# Patient Record
Sex: MALE | Race: WHITE | NOT HISPANIC OR LATINO | Employment: FULL TIME | URBAN - NONMETROPOLITAN AREA
[De-identification: names, ages, dates, MRNs, and addresses within clinical notes are randomized per-mention and may not be internally consistent; named-entity substitution may affect disease eponyms.]

---

## 2022-10-19 ENCOUNTER — TRANSCRIBE ORDERS (OUTPATIENT)
Dept: ADMINISTRATIVE | Facility: HOSPITAL | Age: 52
End: 2022-10-19

## 2022-10-19 DIAGNOSIS — R55 SYNCOPE AND COLLAPSE: Primary | ICD-10-CM

## 2022-10-20 ENCOUNTER — OFFICE VISIT (OUTPATIENT)
Dept: CARDIOLOGY | Facility: CLINIC | Age: 52
End: 2022-10-20

## 2022-10-20 DIAGNOSIS — E78.5 HYPERLIPIDEMIA, UNSPECIFIED HYPERLIPIDEMIA TYPE: ICD-10-CM

## 2022-10-20 DIAGNOSIS — Z72.0 TOBACCO USE: ICD-10-CM

## 2022-10-20 DIAGNOSIS — R55 SYNCOPE AND COLLAPSE: Primary | ICD-10-CM

## 2022-10-20 DIAGNOSIS — I10 ESSENTIAL HYPERTENSION: ICD-10-CM

## 2022-10-20 DIAGNOSIS — E11.9 TYPE 2 DIABETES MELLITUS WITHOUT COMPLICATION, WITHOUT LONG-TERM CURRENT USE OF INSULIN: ICD-10-CM

## 2022-10-20 PROCEDURE — 93000 ELECTROCARDIOGRAM COMPLETE: CPT | Performed by: NURSE PRACTITIONER

## 2022-10-20 PROCEDURE — 99204 OFFICE O/P NEW MOD 45 MIN: CPT | Performed by: NURSE PRACTITIONER

## 2022-10-20 RX ORDER — AMLODIPINE BESYLATE 2.5 MG/1
1 TABLET ORAL DAILY
COMMUNITY
Start: 2022-10-18

## 2022-10-20 RX ORDER — PANTOPRAZOLE SODIUM 40 MG/1
1 TABLET, DELAYED RELEASE ORAL DAILY
COMMUNITY
Start: 2022-09-16

## 2022-10-20 RX ORDER — ATORVASTATIN CALCIUM 20 MG/1
20 TABLET, FILM COATED ORAL DAILY
Qty: 30 TABLET | Refills: 1 | Status: SHIPPED | OUTPATIENT
Start: 2022-10-20

## 2022-10-20 RX ORDER — GABAPENTIN 300 MG/1
1 CAPSULE ORAL 2 TIMES DAILY
COMMUNITY
Start: 2022-10-19

## 2022-10-20 RX ORDER — GLYBURIDE 5 MG/1
2 TABLET ORAL 2 TIMES DAILY
COMMUNITY
Start: 2022-08-10

## 2022-10-20 RX ORDER — OFLOXACIN 3 MG/ML
SOLUTION AURICULAR (OTIC) AS NEEDED
COMMUNITY
Start: 2022-10-18

## 2022-10-20 NOTE — PROGRESS NOTES
Subjective   Serge Chandra is a 52 y.o. male.   Chief Complaint   Patient presents with   • Establish Care   • Loss of Consciousness      History of Present Illness   Serge Chandra is a 52-year-old male who presents to clinic today as a new patient for cardiology evaluation.    He was referred today for further evaluation of intermittent syncopal episodes that he has had over the last 6 months.  He reports his initial episodes were in July after he started lisinopril for hypertension.  He states a few days after taking his first dose of lisinopril he was standing in the hallway things went black and he fell down he did not have any other symptoms at that time denies loss of bowel or bladder function denies dyspnea prior to the episode denies palpitations prior to the episode he did have a prodrome of feeling dizzy and lightheaded before passing out.  He states after this initial episode about a week later he was sitting at the table when he had another similar episode.  His PCP felt it was secondary to the lisinopril and discontinued medication.  He did not monitor blood pressure at that time and was unsure if he experienced hypotension.  With his second episodes he denies any specific complaints.  He is a diabetic on metformin but denies any hypoglycemic episodes that he is aware of.  Once changing his lisinopril to Norvasc he had done well until a week ago when he had another episode at work.  He reports he was sitting on the ledge, taking a break and was feeling fine.  Denies any prodrome of dizziness or lightheadedness with this episode like he had experienced back in July.  He states he woke up on the ground.  He denies any head trauma abrasions, neurological symptoms severe headaches.  He remains on Norvasc 2.5 mg daily which was recently started.  He intermittently checks blood pressure at home which varies, no log available for review today. He had not experienced the same dizziness, lightheadedness he had  with the episodes in July when he had a sudden loss of consciousness.  Family history significant for father with MI.  He denies any history of hyperlipidemia.  He is a tobacco user.    The following portions of the patient's history were reviewed and updated as appropriate: allergies, current medications, past family history, past medical history, past social history, past surgical history and problem list.    Current Outpatient Medications:   •  amLODIPine (NORVASC) 2.5 MG tablet, Take 1 tablet by mouth Daily., Disp: , Rfl:   •  gabapentin (NEURONTIN) 300 MG capsule, Take 1 capsule by mouth 2 (Two) Times a Day., Disp: , Rfl:   •  glyburide (DIAbeta) 5 MG tablet, Take 2 tablets by mouth 2 (Two) Times a Day., Disp: , Rfl:   •  metFORMIN (GLUCOPHAGE) 1000 MG tablet, Take 1 tablet by mouth 2 (Two) Times a Day., Disp: , Rfl:   •  ofloxacin (FLOXIN) 0.3 % otic solution, As Needed., Disp: , Rfl:   •  pantoprazole (PROTONIX) 40 MG EC tablet, Take 1 tablet by mouth Daily., Disp: , Rfl:   •  atorvastatin (LIPITOR) 20 MG tablet, Take 1 tablet by mouth Daily., Disp: 30 tablet, Rfl: 1    Review of Systems   Constitutional: Negative for activity change, appetite change, chills, diaphoresis, fatigue and fever.   HENT: Negative for congestion, drooling, ear discharge, ear pain, mouth sores, nosebleeds, postnasal drip, rhinorrhea, sinus pressure, sneezing and sore throat.    Eyes: Negative for pain, discharge and visual disturbance.   Respiratory: Negative for cough, chest tightness, shortness of breath and wheezing.    Cardiovascular: Negative for chest pain, palpitations and leg swelling.   Gastrointestinal: Negative for abdominal pain, constipation, diarrhea, nausea and vomiting.   Endocrine: Negative for cold intolerance, heat intolerance, polydipsia, polyphagia and polyuria.   Musculoskeletal: Negative for arthralgias, myalgias and neck pain.   Skin: Negative for rash and wound.   Neurological: Positive for dizziness and  "syncope. Negative for speech difficulty, weakness, light-headedness and headaches.   Hematological: Negative for adenopathy. Does not bruise/bleed easily.   Psychiatric/Behavioral: Negative for confusion, dysphoric mood and sleep disturbance. The patient is not nervous/anxious.    All other systems reviewed and are negative.    Patient Active Problem List   Diagnosis   • Essential hypertension   • Hyperlipidemia   • Type 2 diabetes mellitus without complication, without long-term current use of insulin (HCC)   • Tobacco use     Past Medical History:   Diagnosis Date   • Diabetes mellitus (HCC) 07/2010   • Hyperlipidemia 10/27/2022   • Hypertension 09/2021     Past Surgical History:   Procedure Laterality Date   • KIDNEY SURGERY       /60 (BP Location: Left arm, Patient Position: Sitting, Cuff Size: Adult)   Ht 182.9 cm (72\")   Wt 74.8 kg (165 lb)   SpO2 99%   BMI 22.38 kg/m²   Vitals:    10/20/22 1315 10/20/22 1320 10/20/22 1321 10/20/22 1322   Orthostatic BP:  132/84 132/86 120/82   Orthostatic Pulse:  85 93 102   Patient Position: Sitting Sitting Sitting Sitting       Objective   No Known Allergies      Physical Exam  Vitals reviewed.   Constitutional:       Appearance: Normal appearance. He is well-developed.   HENT:      Head: Normocephalic.   Eyes:      Conjunctiva/sclera: Conjunctivae normal.   Neck:      Vascular: No JVD.   Cardiovascular:      Rate and Rhythm: Normal rate and regular rhythm.   Pulmonary:      Effort: Pulmonary effort is normal.      Breath sounds: Normal breath sounds.   Musculoskeletal:      Cervical back: Neck supple.      Right lower leg: No edema.      Left lower leg: No edema.   Skin:     General: Skin is warm and dry.   Neurological:      Mental Status: He is alert and oriented to person, place, and time.   Psychiatric:         Attention and Perception: Attention normal.         Mood and Affect: Mood normal.         Speech: Speech normal.         Behavior: Behavior normal. " Behavior is cooperative.         Cognition and Memory: Cognition normal.           ECG 12 Lead    Date/Time: 10/27/2022 9:10 AM  Performed by: Nayana Franklin APRN  Authorized by: Nayana Franklin APRN   Comparison: compared with previous ECG   Similar to previous ECG  Comparison to previous ECG: Compared to external EKG form PCP   Rhythm: sinus rhythm  Rate: normal  BPM: 85    Clinical impression: normal ECG  Comments: QT/QTc - 338/380                                Assessment & Plan   Diagnoses and all orders for this visit:    1. Syncope and collapse (Primary)  -     Stress Test With Myocardial Perfusion (1 Day); Future  -     Holter Monitor - 72 Hour Up To 15 Days; Future  -     Tilt Table; Future  -     US Carotid Bilateral; Future  -     ECG 12 Lead  EKG, reviewed and discussed  Further testing will be arranged  Advised if new or worsening symptoms while waiting work-up, go to the ER.  He expresses understanding    2. Essential hypertension  Slightly elevated today, patient is asked to closely monitor and call the clinic if BPs are persistently greater than 140/90 for medication titrations.  Counseling in low-sodium dietary intake.  Continue on Norvasc 2.5 mg daily    3. Hyperlipidemia, unspecified hyperlipidemia type  Discussed and reviewed most recent lab work.  His 10-year ASCVD risk is 21.3% and lifetime risk is 69%, given underlying history statin therapy is recommended.  We discussed risk and benefits of medications and he is willing to proceed.  Rx for Lipitor 20 mg daily he will need recheck CMP, CK and FLP in 2 to 3 months.    4. Type 2 diabetes mellitus without complication, without long-term current use of insulin (HCC)  Continue on current medications as recommended by PCP.  Discussed importance of tight glycemic control for overall health benefit    5. Tobacco use  Strongly encouraged in smoking cessation    Other orders  -     atorvastatin (LIPITOR) 20 MG tablet; Take 1 tablet by mouth  Daily.  Dispense: 30 tablet; Refill: 1      Review of medical record  Follow-up in 4 weeks to discuss and review testing, sooner if needed.

## 2022-10-27 ENCOUNTER — PATIENT ROUNDING (BHMG ONLY) (OUTPATIENT)
Dept: CARDIOLOGY | Facility: CLINIC | Age: 52
End: 2022-10-27

## 2022-10-27 VITALS
BODY MASS INDEX: 22.35 KG/M2 | OXYGEN SATURATION: 99 % | SYSTOLIC BLOOD PRESSURE: 148 MMHG | WEIGHT: 165 LBS | DIASTOLIC BLOOD PRESSURE: 60 MMHG | HEIGHT: 72 IN

## 2022-10-27 PROBLEM — E78.5 HYPERLIPIDEMIA: Status: ACTIVE | Noted: 2022-10-27

## 2022-10-27 PROBLEM — Z72.0 TOBACCO USE: Status: ACTIVE | Noted: 2022-10-27

## 2022-10-27 PROBLEM — E11.9 TYPE 2 DIABETES MELLITUS WITHOUT COMPLICATION, WITHOUT LONG-TERM CURRENT USE OF INSULIN: Status: ACTIVE | Noted: 2022-10-27

## 2022-10-27 PROBLEM — I10 ESSENTIAL HYPERTENSION: Status: ACTIVE | Noted: 2022-10-27

## 2022-10-27 NOTE — PROGRESS NOTES
October 27, 2022    Hello, may I speak with Serge Chandra?    My name is John    I am  with [facility]  [facility]  [address] [place] [address] [phone].    Before we get started may I verify your date of birth? 1970    I am calling to officially welcome you to our practice and ask about your recent visit. Is this a good time to talk? YES     Tell me about your visit with us. What things went well?  I had a good visit. I was happy with how things went. I have no complaints or concerns       We're always looking for ways to make our patients' experiences even better. Do you have recommendations on ways we may improve?   NO    Overall were you satisfied with your first visit to our practice? YES       I appreciate you taking the time to speak with me today. Is there anything else I can do for you? YES      Thank you, and have a great day.

## 2022-10-31 ENCOUNTER — APPOINTMENT (OUTPATIENT)
Dept: CARDIOLOGY | Facility: HOSPITAL | Age: 52
End: 2022-10-31

## 2022-10-31 ENCOUNTER — APPOINTMENT (OUTPATIENT)
Dept: NUCLEAR MEDICINE | Facility: HOSPITAL | Age: 52
End: 2022-10-31

## 2023-02-06 ENCOUNTER — HOSPITAL ENCOUNTER (EMERGENCY)
Facility: HOSPITAL | Age: 53
Discharge: HOME | End: 2023-02-06
Attending: EMERGENCY MEDICINE | Admitting: EMERGENCY MEDICINE
Payer: COMMERCIAL

## 2023-02-06 ENCOUNTER — APPOINTMENT (OUTPATIENT)
Dept: CT IMAGING | Facility: HOSPITAL | Age: 53
End: 2023-02-06
Payer: COMMERCIAL

## 2023-02-06 ENCOUNTER — APPOINTMENT (OUTPATIENT)
Dept: GENERAL RADIOLOGY | Facility: HOSPITAL | Age: 53
End: 2023-02-06
Payer: COMMERCIAL

## 2023-02-06 VITALS
HEART RATE: 89 BPM | DIASTOLIC BLOOD PRESSURE: 73 MMHG | SYSTOLIC BLOOD PRESSURE: 126 MMHG | HEIGHT: 71 IN | BODY MASS INDEX: 23.8 KG/M2 | RESPIRATION RATE: 18 BRPM | TEMPERATURE: 96.9 F | OXYGEN SATURATION: 97 % | WEIGHT: 170 LBS

## 2023-02-06 DIAGNOSIS — S20.211A CONTUSION OF RIB ON RIGHT SIDE, INITIAL ENCOUNTER: ICD-10-CM

## 2023-02-06 DIAGNOSIS — S09.90XA INJURY OF HEAD, INITIAL ENCOUNTER: ICD-10-CM

## 2023-02-06 DIAGNOSIS — S02.2XXA CLOSED FRACTURE OF NASAL BONE, INITIAL ENCOUNTER: Primary | ICD-10-CM

## 2023-02-06 LAB
ALBUMIN SERPL-MCNC: 4.2 G/DL (ref 3.5–5.2)
ALBUMIN/GLOB SERPL: 1.5 G/DL
ALP SERPL-CCNC: 79 U/L (ref 39–117)
ALT SERPL W P-5'-P-CCNC: 27 U/L (ref 1–41)
AMPHET+METHAMPHET UR QL: NEGATIVE
AMPHETAMINES UR QL: NEGATIVE
ANION GAP SERPL CALCULATED.3IONS-SCNC: 11.5 MMOL/L (ref 5–15)
AST SERPL-CCNC: 35 U/L (ref 1–40)
BARBITURATES UR QL SCN: NEGATIVE
BASOPHILS # BLD AUTO: 0.08 10*3/MM3 (ref 0–0.2)
BASOPHILS NFR BLD AUTO: 0.4 % (ref 0–1.5)
BENZODIAZ UR QL SCN: NEGATIVE
BILIRUB SERPL-MCNC: 0.3 MG/DL (ref 0–1.2)
BILIRUB UR QL STRIP: NEGATIVE
BUN SERPL-MCNC: 18 MG/DL (ref 6–20)
BUN/CREAT SERPL: 21.7 (ref 7–25)
BUPRENORPHINE SERPL-MCNC: NEGATIVE NG/ML
CALCIUM SPEC-SCNC: 8.9 MG/DL (ref 8.6–10.5)
CANNABINOIDS SERPL QL: POSITIVE
CHLORIDE SERPL-SCNC: 104 MMOL/L (ref 98–107)
CLARITY UR: CLEAR
CO2 SERPL-SCNC: 19.5 MMOL/L (ref 22–29)
COCAINE UR QL: NEGATIVE
COLOR UR: ABNORMAL
CREAT SERPL-MCNC: 0.83 MG/DL (ref 0.76–1.27)
CRP SERPL-MCNC: <0.3 MG/DL (ref 0–0.5)
DEPRECATED RDW RBC AUTO: 43.7 FL (ref 37–54)
EGFRCR SERPLBLD CKD-EPI 2021: 104.7 ML/MIN/1.73
EOSINOPHIL # BLD AUTO: 0.02 10*3/MM3 (ref 0–0.4)
EOSINOPHIL NFR BLD AUTO: 0.1 % (ref 0.3–6.2)
ERYTHROCYTE [DISTWIDTH] IN BLOOD BY AUTOMATED COUNT: 13 % (ref 12.3–15.4)
ETHANOL BLD-MCNC: <10 MG/DL (ref 0–10)
ETHANOL UR QL: <0.01 %
FLUAV RNA RESP QL NAA+PROBE: NOT DETECTED
FLUBV RNA ISLT QL NAA+PROBE: NOT DETECTED
GLOBULIN UR ELPH-MCNC: 2.8 GM/DL
GLUCOSE BLDC GLUCOMTR-MCNC: 200 MG/DL (ref 70–130)
GLUCOSE BLDC GLUCOMTR-MCNC: 321 MG/DL (ref 70–130)
GLUCOSE SERPL-MCNC: 280 MG/DL (ref 65–99)
GLUCOSE UR STRIP-MCNC: ABNORMAL MG/DL
HCT VFR BLD AUTO: 43.2 % (ref 37.5–51)
HGB BLD-MCNC: 14.4 G/DL (ref 13–17.7)
HGB UR QL STRIP.AUTO: NEGATIVE
HOLD SPECIMEN: NORMAL
HOLD SPECIMEN: NORMAL
IMM GRANULOCYTES # BLD AUTO: 0.15 10*3/MM3 (ref 0–0.05)
IMM GRANULOCYTES NFR BLD AUTO: 0.7 % (ref 0–0.5)
KETONES UR QL STRIP: ABNORMAL
LEUKOCYTE ESTERASE UR QL STRIP.AUTO: NEGATIVE
LYMPHOCYTES # BLD AUTO: 0.92 10*3/MM3 (ref 0.7–3.1)
LYMPHOCYTES NFR BLD AUTO: 4.5 % (ref 19.6–45.3)
MCH RBC QN AUTO: 30.8 PG (ref 26.6–33)
MCHC RBC AUTO-ENTMCNC: 33.3 G/DL (ref 31.5–35.7)
MCV RBC AUTO: 92.3 FL (ref 79–97)
METHADONE UR QL SCN: NEGATIVE
MONOCYTES # BLD AUTO: 1.1 10*3/MM3 (ref 0.1–0.9)
MONOCYTES NFR BLD AUTO: 5.3 % (ref 5–12)
NEUTROPHILS NFR BLD AUTO: 18.34 10*3/MM3 (ref 1.7–7)
NEUTROPHILS NFR BLD AUTO: 89 % (ref 42.7–76)
NITRITE UR QL STRIP: NEGATIVE
NRBC BLD AUTO-RTO: 0 /100 WBC (ref 0–0.2)
OPIATES UR QL: NEGATIVE
OXYCODONE UR QL SCN: NEGATIVE
PCP UR QL SCN: NEGATIVE
PH UR STRIP.AUTO: 5.5 [PH] (ref 5–8)
PLATELET # BLD AUTO: 240 10*3/MM3 (ref 140–450)
PMV BLD AUTO: 10.8 FL (ref 6–12)
POTASSIUM SERPL-SCNC: 5.1 MMOL/L (ref 3.5–5.2)
PROPOXYPH UR QL: NEGATIVE
PROT SERPL-MCNC: 7 G/DL (ref 6–8.5)
PROT UR QL STRIP: ABNORMAL
QT INTERVAL: 356 MS
QTC INTERVAL: 428 MS
RBC # BLD AUTO: 4.68 10*6/MM3 (ref 4.14–5.8)
SARS-COV-2 RNA PNL SPEC NAA+PROBE: NOT DETECTED
SODIUM SERPL-SCNC: 135 MMOL/L (ref 136–145)
SP GR UR STRIP: >=1.03 (ref 1–1.03)
TRICYCLICS UR QL SCN: NEGATIVE
TROPONIN T SERPL-MCNC: <0.01 NG/ML (ref 0–0.03)
TROPONIN T SERPL-MCNC: <0.01 NG/ML (ref 0–0.03)
UROBILINOGEN UR QL STRIP: ABNORMAL
WBC NRBC COR # BLD: 20.61 10*3/MM3 (ref 3.4–10.8)
WHOLE BLOOD HOLD COAG: NORMAL
WHOLE BLOOD HOLD SPECIMEN: NORMAL

## 2023-02-06 PROCEDURE — 70486 CT MAXILLOFACIAL W/O DYE: CPT

## 2023-02-06 PROCEDURE — 93005 ELECTROCARDIOGRAM TRACING: CPT | Performed by: EMERGENCY MEDICINE

## 2023-02-06 PROCEDURE — 36415 COLL VENOUS BLD VENIPUNCTURE: CPT

## 2023-02-06 PROCEDURE — 99283 EMERGENCY DEPT VISIT LOW MDM: CPT

## 2023-02-06 PROCEDURE — 72125 CT NECK SPINE W/O DYE: CPT

## 2023-02-06 PROCEDURE — 71101 X-RAY EXAM UNILAT RIBS/CHEST: CPT

## 2023-02-06 PROCEDURE — 82077 ASSAY SPEC XCP UR&BREATH IA: CPT | Performed by: NURSE PRACTITIONER

## 2023-02-06 PROCEDURE — 81003 URINALYSIS AUTO W/O SCOPE: CPT | Performed by: NURSE PRACTITIONER

## 2023-02-06 PROCEDURE — 99284 EMERGENCY DEPT VISIT MOD MDM: CPT

## 2023-02-06 PROCEDURE — 86140 C-REACTIVE PROTEIN: CPT | Performed by: NURSE PRACTITIONER

## 2023-02-06 PROCEDURE — 82962 GLUCOSE BLOOD TEST: CPT

## 2023-02-06 PROCEDURE — 71101 X-RAY EXAM UNILAT RIBS/CHEST: CPT | Performed by: RADIOLOGY

## 2023-02-06 PROCEDURE — 70450 CT HEAD/BRAIN W/O DYE: CPT

## 2023-02-06 PROCEDURE — 85025 COMPLETE CBC W/AUTO DIFF WBC: CPT | Performed by: NURSE PRACTITIONER

## 2023-02-06 PROCEDURE — 87636 SARSCOV2 & INF A&B AMP PRB: CPT | Performed by: NURSE PRACTITIONER

## 2023-02-06 PROCEDURE — 84484 ASSAY OF TROPONIN QUANT: CPT | Performed by: NURSE PRACTITIONER

## 2023-02-06 PROCEDURE — 80306 DRUG TEST PRSMV INSTRMNT: CPT | Performed by: NURSE PRACTITIONER

## 2023-02-06 PROCEDURE — 80053 COMPREHEN METABOLIC PANEL: CPT | Performed by: NURSE PRACTITIONER

## 2023-02-06 RX ORDER — HYDROCODONE BITARTRATE AND ACETAMINOPHEN 5; 325 MG/1; MG/1
1 TABLET ORAL EVERY 6 HOURS PRN
Qty: 12 TABLET | Refills: 0 | Status: SHIPPED | OUTPATIENT
Start: 2023-02-06 | End: 2023-02-09

## 2023-02-06 RX ORDER — HYDROCODONE BITARTRATE AND ACETAMINOPHEN 5; 325 MG/1; MG/1
1 TABLET ORAL ONCE
Status: COMPLETED | OUTPATIENT
Start: 2023-02-06 | End: 2023-02-06

## 2023-02-06 RX ADMIN — HYDROCODONE BITARTRATE AND ACETAMINOPHEN 1 TABLET: 5; 325 TABLET ORAL at 20:14
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
## 2023-02-07 NOTE — DISCHARGE INSTRUCTIONS
Please take your pain medication and follow up with your PCP and ENT in 2 days or return to ER if symptoms worsen.

## 2023-02-23 ENCOUNTER — HOSPITAL ENCOUNTER (OUTPATIENT)
Dept: CARDIOLOGY | Facility: HOSPITAL | Age: 53
Discharge: HOME | End: 2023-02-23
Admitting: NURSE PRACTITIONER
Payer: COMMERCIAL

## 2023-02-23 VITALS
HEART RATE: 110 BPM | DIASTOLIC BLOOD PRESSURE: 76 MMHG | BODY MASS INDEX: 23.8 KG/M2 | WEIGHT: 169.97 LBS | HEIGHT: 71 IN | SYSTOLIC BLOOD PRESSURE: 110 MMHG

## 2023-02-23 DIAGNOSIS — R55 SYNCOPE AND COLLAPSE: ICD-10-CM

## 2023-02-23 LAB
MAXIMAL PREDICTED HEART RATE: 167 BPM
STRESS TARGET HR: 142 BPM

## 2023-02-23 PROCEDURE — 93660 TILT TABLE EVALUATION: CPT

## 2023-02-23 PROCEDURE — 93660 TILT TABLE EVALUATION: CPT | Performed by: INTERNAL MEDICINE

## 2023-03-15 ENCOUNTER — OFFICE VISIT (OUTPATIENT)
Dept: CARDIOLOGY | Facility: CLINIC | Age: 53
End: 2023-03-15
Payer: COMMERCIAL

## 2023-03-15 VITALS
DIASTOLIC BLOOD PRESSURE: 84 MMHG | WEIGHT: 160 LBS | SYSTOLIC BLOOD PRESSURE: 135 MMHG | HEIGHT: 71 IN | HEART RATE: 95 BPM | OXYGEN SATURATION: 99 % | BODY MASS INDEX: 22.4 KG/M2

## 2023-03-15 DIAGNOSIS — E78.5 HYPERLIPIDEMIA, UNSPECIFIED HYPERLIPIDEMIA TYPE: ICD-10-CM

## 2023-03-15 DIAGNOSIS — R06.00 DYSPNEA, UNSPECIFIED TYPE: ICD-10-CM

## 2023-03-15 DIAGNOSIS — Z91.89 MULTIPLE RISK FACTORS FOR CORONARY ARTERY DISEASE: ICD-10-CM

## 2023-03-15 DIAGNOSIS — I10 ESSENTIAL HYPERTENSION: ICD-10-CM

## 2023-03-15 DIAGNOSIS — E11.9 TYPE 2 DIABETES MELLITUS WITHOUT COMPLICATION, WITHOUT LONG-TERM CURRENT USE OF INSULIN: ICD-10-CM

## 2023-03-15 DIAGNOSIS — Z72.0 TOBACCO USE: ICD-10-CM

## 2023-03-15 DIAGNOSIS — R55 SYNCOPE AND COLLAPSE: Primary | ICD-10-CM

## 2023-03-15 PROCEDURE — 93246 EXT ECG>7D<15D RECORDING: CPT | Performed by: INTERNAL MEDICINE

## 2023-03-15 PROCEDURE — 99214 OFFICE O/P EST MOD 30 MIN: CPT | Performed by: NURSE PRACTITIONER

## 2023-03-15 NOTE — LETTER
April 4, 2023     SHAUN Dumont  [address] [facility] [address]    Patient: Serge Chandra   YOB: 1970   Date of Visit: 3/15/2023       Dear SHAUN Dumont    Serge Chandra was in my office today. Below is a copy of my note.    If you have questions, please do not hesitate to call me. I look forward to following Serge along with you.         Sincerely,        SHAUN Murillo        CC: No Recipients    Subjective     Serge Chandra is a 52 y.o. male.   Chief Complaint   Patient presents with   • Follow-up     Reorder testing   • Results     Tilt table   • Loss of Consciousness     02/06/2023   • Dizziness   • Palpitations      History of Present Illness   Serge Chandra is a 52-year-old male who presents to clinic today for delayed cardiology follow-up.    He was initially seen for syncopal episodes and cardiac work-up was ordered however he did not complete all the recommended testing however he is willing to reschedule. He was recently seen at [facility] ER for syncope.  He had a CT of the head and a normal head CT.  He also had CT imaging of the facial bones with a nasal fracture.  CT imaging of the C-spine with degenerative disc disease noted, no fractures.  Labs reviewed from ER visit with elevated glucose and white blood count.  He recently did complete tilt table testing which he would like to discuss today.  He has not had ENT nor neurology work-up for the syncopal episodes.  He denies any chest discomfort, dyspnea, palpitations, nausea/vomiting, dizziness prior to these episodes.    Hypertension currently on Norvasc 2.5 mg daily.  Home readings are controlled.  He denies hypotension.      Hyperlipidemia on Lipitor.  He reports some compliance.  He does not take it on a daily basis.  He denies medication side effects.  He continues to smoke.    Underlying DM currently treated by PCP on oral medications.    Patient Active Problem List   Diagnosis   • Essential  hypertension   • Hyperlipidemia   • Type 2 diabetes mellitus without complication, without long-term current use of insulin   • Tobacco use   • Multiple risk factors for coronary artery disease   • Syncope and collapse     Past Medical History:   Diagnosis Date   • Diabetes mellitus 2010   • Hyperlipidemia 10/27/2022   • Hypertension 2021     Past Surgical History:   Procedure Laterality Date   • KIDNEY SURGERY       Family History   Problem Relation Age of Onset   • No Known Problems Mother    • Diabetes Father    • Stroke Father    • Hypertension Brother    • Hypertension Brother    • Heart attack Maternal Grandfather    • Stroke Paternal Grandmother      Social History     Tobacco Use   • Smoking status: Every Day     Packs/day: 1.00     Years: 30.00     Pack years: 30.00     Types: Cigarettes     Start date: 10/19/2022     Last attempt to quit: 10/20/2022     Years since quittin.4   • Smokeless tobacco: Never   Vaping Use   • Vaping Use: Never used   Substance Use Topics   • Alcohol use: Not Currently   • Drug use: Never     The following portions of the patient's history were reviewed and updated as appropriate: allergies, current medications, past family history, past medical history, past social history, past surgical history and problem list.    Allergies   Allergen Reactions   • Bee Venom Swelling         Current Outpatient Medications:   •  amLODIPine (NORVASC) 2.5 MG tablet, Take 1 tablet by mouth Daily., Disp: , Rfl:   •  atorvastatin (LIPITOR) 20 MG tablet, Take 1 tablet by mouth Daily., Disp: 30 tablet, Rfl: 1  •  gabapentin (NEURONTIN) 300 MG capsule, Take 1 capsule by mouth 2 (Two) Times a Day., Disp: , Rfl:   •  glyburide (DIAbeta) 5 MG tablet, Take 2 tablets by mouth 2 (Two) Times a Day., Disp: , Rfl:   •  metFORMIN (GLUCOPHAGE) 1000 MG tablet, Take 1 tablet by mouth 2 (Two) Times a Day., Disp: , Rfl:   •  ofloxacin (FLOXIN) 0.3 % otic solution, As Needed., Disp: , Rfl:   •  pantoprazole  "(PROTONIX) 40 MG EC tablet, Take 1 tablet by mouth Daily., Disp: , Rfl:     Review of Systems   Constitutional: Negative for activity change, appetite change, chills, diaphoresis, fatigue and fever.   HENT: Negative for congestion, drooling, ear discharge, ear pain, mouth sores, nosebleeds, postnasal drip, rhinorrhea, sinus pressure, sneezing and sore throat.    Eyes: Negative for pain, discharge and visual disturbance.   Respiratory: Negative for cough, chest tightness, shortness of breath and wheezing.    Cardiovascular: Negative for chest pain, palpitations and leg swelling.   Gastrointestinal: Negative for abdominal pain, constipation, diarrhea, nausea and vomiting.   Endocrine: Negative for cold intolerance, heat intolerance, polydipsia, polyphagia and polyuria.   Musculoskeletal: Negative for arthralgias, myalgias and neck pain.   Skin: Negative for rash and wound.   Neurological: Positive for syncope. Negative for dizziness, speech difficulty, weakness, light-headedness and headaches.   Hematological: Negative for adenopathy. Does not bruise/bleed easily.   Psychiatric/Behavioral: Negative for confusion, dysphoric mood and sleep disturbance. The patient is not nervous/anxious.    All other systems reviewed and are negative.    /84 (BP Location: Left arm, Patient Position: Sitting, Cuff Size: Adult)   Pulse 95   Ht 180.3 cm (70.98\")   Wt 72.6 kg (160 lb)   SpO2 99%   BMI 22.33 kg/m²     Objective   Allergies   Allergen Reactions   • Bee Venom Swelling     Physical Exam  Vitals reviewed.   Constitutional:       Appearance: Normal appearance. He is well-developed.   HENT:      Head: Normocephalic.   Eyes:      Conjunctiva/sclera: Conjunctivae normal.   Neck:      Vascular: No carotid bruit or JVD.   Cardiovascular:      Rate and Rhythm: Normal rate and regular rhythm.   Pulmonary:      Effort: Pulmonary effort is normal.      Breath sounds: Normal breath sounds.   Musculoskeletal:      Cervical back: " Neck supple.      Right lower leg: No edema.      Left lower leg: No edema.   Skin:     General: Skin is warm and dry.   Neurological:      Mental Status: He is alert and oriented to person, place, and time.   Psychiatric:         Attention and Perception: Attention normal.         Mood and Affect: Mood normal.         Speech: Speech normal.         Behavior: Behavior normal. Behavior is cooperative.         Cognition and Memory: Cognition normal.           LABS  WBC   Date Value Ref Range Status   02/06/2023 20.61 (H) 3.40 - 10.80 10*3/mm3 Final     RBC   Date Value Ref Range Status   02/06/2023 4.68 4.14 - 5.80 10*6/mm3 Final     Hemoglobin   Date Value Ref Range Status   02/06/2023 14.4 13.0 - 17.7 g/dL Final     Hematocrit   Date Value Ref Range Status   02/06/2023 43.2 37.5 - 51.0 % Final     MCV   Date Value Ref Range Status   02/06/2023 92.3 79.0 - 97.0 fL Final     MCH   Date Value Ref Range Status   02/06/2023 30.8 26.6 - 33.0 pg Final     MCHC   Date Value Ref Range Status   02/06/2023 33.3 31.5 - 35.7 g/dL Final     RDW   Date Value Ref Range Status   02/06/2023 13.0 12.3 - 15.4 % Final     RDW-SD   Date Value Ref Range Status   02/06/2023 43.7 37.0 - 54.0 fl Final     MPV   Date Value Ref Range Status   02/06/2023 10.8 6.0 - 12.0 fL Final     Platelets   Date Value Ref Range Status   02/06/2023 240 140 - 450 10*3/mm3 Final     Neutrophil %   Date Value Ref Range Status   02/06/2023 89.0 (H) 42.7 - 76.0 % Final     Lymphocyte %   Date Value Ref Range Status   02/06/2023 4.5 (L) 19.6 - 45.3 % Final     Monocyte %   Date Value Ref Range Status   02/06/2023 5.3 5.0 - 12.0 % Final     Eosinophil %   Date Value Ref Range Status   02/06/2023 0.1 (L) 0.3 - 6.2 % Final     Basophil %   Date Value Ref Range Status   02/06/2023 0.4 0.0 - 1.5 % Final     Immature Grans %   Date Value Ref Range Status   02/06/2023 0.7 (H) 0.0 - 0.5 % Final     Neutrophils, Absolute   Date Value Ref Range Status   02/06/2023 18.34 (H)  1.70 - 7.00 10*3/mm3 Final     Lymphocytes, Absolute   Date Value Ref Range Status   02/06/2023 0.92 0.70 - 3.10 10*3/mm3 Final     Monocytes, Absolute   Date Value Ref Range Status   02/06/2023 1.10 (H) 0.10 - 0.90 10*3/mm3 Final     Eosinophils, Absolute   Date Value Ref Range Status   02/06/2023 0.02 0.00 - 0.40 10*3/mm3 Final     Basophils, Absolute   Date Value Ref Range Status   02/06/2023 0.08 0.00 - 0.20 10*3/mm3 Final     Immature Grans, Absolute   Date Value Ref Range Status   02/06/2023 0.15 (H) 0.00 - 0.05 10*3/mm3 Final     nRBC   Date Value Ref Range Status   02/06/2023 0.0 0.0 - 0.2 /100 WBC Final     No results found for: CHOL, TRIG, HDL, LDL    IMAGING  XR Ribs Right With PA Chest    Result Date: 2/6/2023    No acute findings in the chest or right ribs.  This report was finalized on 2/6/2023 3:55 PM by Dr. Mason Gupta MD.      CT Head Without Contrast    Result Date: 2/6/2023  Normal, negative unenhanced head CT. Signer Name: Jasper Galvin MD  Signed: 2/6/2023 7:18 PM  Workstation Name: [facility]  Radiology [facility]    CT Cervical Spine Without Contrast    Result Date: 2/6/2023  Midcervical degenerative disc disease. No fracture seen. Signer Name: Jasper Galvin MD  Signed: 2/6/2023 7:21 PM  Workstation Name: [facility]  Radiology [facility]    CT Facial Bones Without Contrast    Result Date: 2/6/2023  Comminuted, displaced and depressed nasal fracture. No additional facial fractures are seen. Signer Name: Jasper Galvin MD  Signed: 2/6/2023 7:24 PM  Workstation Name: [facility]  Radiology [facility]      Tilt table 2/23/2023  Interpretation Summary       •  Tilt table test was negative for neurocardiogenic syncope, postural orthostatic tachycardia syndrome and orthostatic syncope. There was an increase in heart rate from 112 to a peak of 141 after 36 minutes of being upright without clinically significant changes in blood pressure or reported  symptoms.         Assessment & Plan   Diagnoses and all orders for this visit:    1. Syncope and collapse (Primary)  -     Stress Test With Myocardial Perfusion (1 Day); Future  -     US Carotid Bilateral; Future  -     Holter Monitor - 72 Hour Up To 15 Days; Future  Further work-up will be arranged   Discussed and reviewed tilt table testing  Advised if new or worsening symptoms following work-up, return to the ER.  He expresses understanding  Encourage patient to further discuss ENT/neurology work-up for syncope.    2. Dyspnea, unspecified type  -     Adult Transthoracic Echo Complete W/ Cont if Necessary Per Protocol; Future  Further work-up will be arranged    3. Hyperlipidemia, unspecified hyperlipidemia type  Continue on statin, heart healthy diet    4. Essential hypertension  Controlled, continue current therapy, sodium restrictions, routine monitoring    5. Type 2 diabetes mellitus without complication, without long-term current use of insulin  Encouraging compliance with oral medications and benefit of tight glycemic control    6. Multiple risk factors for coronary artery disease  -     Stress Test With Myocardial Perfusion (1 Day); Future  Further testing will be arranged    7. Tobacco use  Recommend avoidance of tobacco use      Recommend he follow-up with ENT for nasal fracture and further discuss elevated white blood count with PCP.      Follow-up in 4 to 6 weeks to discuss and review testing, sooner if needed

## 2023-03-15 NOTE — PROGRESS NOTES
Subjective     Serge Chandra is a 52 y.o. male.   Chief Complaint   Patient presents with   • Follow-up     Reorder testing   • Results     Tilt table   • Loss of Consciousness     02/06/2023   • Dizziness   • Palpitations      History of Present Illness   Serge Chandra is a 52-year-old male who presents to clinic today for delayed cardiology follow-up.    He was initially seen for syncopal episodes and cardiac work-up was ordered however he did not complete all the recommended testing however he is willing to reschedule. He was recently seen at [facility] ER for syncope.  He had a CT of the head and a normal head CT.  He also had CT imaging of the facial bones with a nasal fracture.  CT imaging of the C-spine with degenerative disc disease noted, no fractures.  Labs reviewed from ER visit with elevated glucose and white blood count.  He recently did complete tilt table testing which he would like to discuss today.  He has not had ENT nor neurology work-up for the syncopal episodes.  He denies any chest discomfort, dyspnea, palpitations, nausea/vomiting, dizziness prior to these episodes.    Hypertension currently on Norvasc 2.5 mg daily.  Home readings are controlled.  He denies hypotension.      Hyperlipidemia on Lipitor.  He reports some compliance.  He does not take it on a daily basis.  He denies medication side effects.  He continues to smoke.    Underlying DM currently treated by PCP on oral medications.    Patient Active Problem List   Diagnosis   • Essential hypertension   • Hyperlipidemia   • Type 2 diabetes mellitus without complication, without long-term current use of insulin   • Tobacco use   • Multiple risk factors for coronary artery disease   • Syncope and collapse     Past Medical History:   Diagnosis Date   • Diabetes mellitus 07/2010   • Hyperlipidemia 10/27/2022   • Hypertension 09/2021     Past Surgical History:   Procedure Laterality Date   • KIDNEY SURGERY       Family History    Problem Relation Age of Onset   • No Known Problems Mother    • Diabetes Father    • Stroke Father    • Hypertension Brother    • Hypertension Brother    • Heart attack Maternal Grandfather    • Stroke Paternal Grandmother      Social History     Tobacco Use   • Smoking status: Every Day     Packs/day: 1.00     Years: 30.00     Pack years: 30.00     Types: Cigarettes     Start date: 10/19/2022     Last attempt to quit: 10/20/2022     Years since quittin.4   • Smokeless tobacco: Never   Vaping Use   • Vaping Use: Never used   Substance Use Topics   • Alcohol use: Not Currently   • Drug use: Never     The following portions of the patient's history were reviewed and updated as appropriate: allergies, current medications, past family history, past medical history, past social history, past surgical history and problem list.    Allergies   Allergen Reactions   • Bee Venom Swelling         Current Outpatient Medications:   •  amLODIPine (NORVASC) 2.5 MG tablet, Take 1 tablet by mouth Daily., Disp: , Rfl:   •  atorvastatin (LIPITOR) 20 MG tablet, Take 1 tablet by mouth Daily., Disp: 30 tablet, Rfl: 1  •  gabapentin (NEURONTIN) 300 MG capsule, Take 1 capsule by mouth 2 (Two) Times a Day., Disp: , Rfl:   •  glyburide (DIAbeta) 5 MG tablet, Take 2 tablets by mouth 2 (Two) Times a Day., Disp: , Rfl:   •  metFORMIN (GLUCOPHAGE) 1000 MG tablet, Take 1 tablet by mouth 2 (Two) Times a Day., Disp: , Rfl:   •  ofloxacin (FLOXIN) 0.3 % otic solution, As Needed., Disp: , Rfl:   •  pantoprazole (PROTONIX) 40 MG EC tablet, Take 1 tablet by mouth Daily., Disp: , Rfl:     Review of Systems   Constitutional: Negative for activity change, appetite change, chills, diaphoresis, fatigue and fever.   HENT: Negative for congestion, drooling, ear discharge, ear pain, mouth sores, nosebleeds, postnasal drip, rhinorrhea, sinus pressure, sneezing and sore throat.    Eyes: Negative for pain, discharge and visual disturbance.   Respiratory:  "Negative for cough, chest tightness, shortness of breath and wheezing.    Cardiovascular: Negative for chest pain, palpitations and leg swelling.   Gastrointestinal: Negative for abdominal pain, constipation, diarrhea, nausea and vomiting.   Endocrine: Negative for cold intolerance, heat intolerance, polydipsia, polyphagia and polyuria.   Musculoskeletal: Negative for arthralgias, myalgias and neck pain.   Skin: Negative for rash and wound.   Neurological: Positive for syncope. Negative for dizziness, speech difficulty, weakness, light-headedness and headaches.   Hematological: Negative for adenopathy. Does not bruise/bleed easily.   Psychiatric/Behavioral: Negative for confusion, dysphoric mood and sleep disturbance. The patient is not nervous/anxious.    All other systems reviewed and are negative.    /84 (BP Location: Left arm, Patient Position: Sitting, Cuff Size: Adult)   Pulse 95   Ht 180.3 cm (70.98\")   Wt 72.6 kg (160 lb)   SpO2 99%   BMI 22.33 kg/m²     Objective   Allergies   Allergen Reactions   • Bee Venom Swelling     Physical Exam  Vitals reviewed.   Constitutional:       Appearance: Normal appearance. He is well-developed.   HENT:      Head: Normocephalic.   Eyes:      Conjunctiva/sclera: Conjunctivae normal.   Neck:      Vascular: No carotid bruit or JVD.   Cardiovascular:      Rate and Rhythm: Normal rate and regular rhythm.   Pulmonary:      Effort: Pulmonary effort is normal.      Breath sounds: Normal breath sounds.   Musculoskeletal:      Cervical back: Neck supple.      Right lower leg: No edema.      Left lower leg: No edema.   Skin:     General: Skin is warm and dry.   Neurological:      Mental Status: He is alert and oriented to person, place, and time.   Psychiatric:         Attention and Perception: Attention normal.         Mood and Affect: Mood normal.         Speech: Speech normal.         Behavior: Behavior normal. Behavior is cooperative.         Cognition and Memory: " Cognition normal.           LABS  WBC   Date Value Ref Range Status   02/06/2023 20.61 (H) 3.40 - 10.80 10*3/mm3 Final     RBC   Date Value Ref Range Status   02/06/2023 4.68 4.14 - 5.80 10*6/mm3 Final     Hemoglobin   Date Value Ref Range Status   02/06/2023 14.4 13.0 - 17.7 g/dL Final     Hematocrit   Date Value Ref Range Status   02/06/2023 43.2 37.5 - 51.0 % Final     MCV   Date Value Ref Range Status   02/06/2023 92.3 79.0 - 97.0 fL Final     MCH   Date Value Ref Range Status   02/06/2023 30.8 26.6 - 33.0 pg Final     MCHC   Date Value Ref Range Status   02/06/2023 33.3 31.5 - 35.7 g/dL Final     RDW   Date Value Ref Range Status   02/06/2023 13.0 12.3 - 15.4 % Final     RDW-SD   Date Value Ref Range Status   02/06/2023 43.7 37.0 - 54.0 fl Final     MPV   Date Value Ref Range Status   02/06/2023 10.8 6.0 - 12.0 fL Final     Platelets   Date Value Ref Range Status   02/06/2023 240 140 - 450 10*3/mm3 Final     Neutrophil %   Date Value Ref Range Status   02/06/2023 89.0 (H) 42.7 - 76.0 % Final     Lymphocyte %   Date Value Ref Range Status   02/06/2023 4.5 (L) 19.6 - 45.3 % Final     Monocyte %   Date Value Ref Range Status   02/06/2023 5.3 5.0 - 12.0 % Final     Eosinophil %   Date Value Ref Range Status   02/06/2023 0.1 (L) 0.3 - 6.2 % Final     Basophil %   Date Value Ref Range Status   02/06/2023 0.4 0.0 - 1.5 % Final     Immature Grans %   Date Value Ref Range Status   02/06/2023 0.7 (H) 0.0 - 0.5 % Final     Neutrophils, Absolute   Date Value Ref Range Status   02/06/2023 18.34 (H) 1.70 - 7.00 10*3/mm3 Final     Lymphocytes, Absolute   Date Value Ref Range Status   02/06/2023 0.92 0.70 - 3.10 10*3/mm3 Final     Monocytes, Absolute   Date Value Ref Range Status   02/06/2023 1.10 (H) 0.10 - 0.90 10*3/mm3 Final     Eosinophils, Absolute   Date Value Ref Range Status   02/06/2023 0.02 0.00 - 0.40 10*3/mm3 Final     Basophils, Absolute   Date Value Ref Range Status   02/06/2023 0.08 0.00 - 0.20 10*3/mm3 Final      Immature Grans, Absolute   Date Value Ref Range Status   02/06/2023 0.15 (H) 0.00 - 0.05 10*3/mm3 Final     nRBC   Date Value Ref Range Status   02/06/2023 0.0 0.0 - 0.2 /100 WBC Final     No results found for: CHOL, TRIG, HDL, LDL    IMAGING  XR Ribs Right With PA Chest    Result Date: 2/6/2023    No acute findings in the chest or right ribs.  This report was finalized on 2/6/2023 3:55 PM by Dr. Mason Gupta MD.      CT Head Without Contrast    Result Date: 2/6/2023  Normal, negative unenhanced head CT. Signer Name: Jasper Galvin MD  Signed: 2/6/2023 7:18 PM  Workstation Name: [facility]    CT Cervical Spine Without Contrast    Result Date: 2/6/2023  Midcervical degenerative disc disease. No fracture seen. Signer Name: Jasper Galvin MD  Signed: 2/6/2023 7:21 PM  Workstation Name: [facility]    CT Facial Bones Without Contrast    Result Date: 2/6/2023  Comminuted, displaced and depressed nasal fracture. No additional facial fractures are seen. Signer Name: Jasper Galvin MD  Signed: 2/6/2023 7:24 PM  Workstation Name: [facility]      Tilt table 2/23/2023  Interpretation Summary       •  Tilt table test was negative for neurocardiogenic syncope, postural orthostatic tachycardia syndrome and orthostatic syncope. There was an increase in heart rate from 112 to a peak of 141 after 36 minutes of being upright without clinically significant changes in blood pressure or reported symptoms.         Assessment & Plan   Diagnoses and all orders for this visit:    1. Syncope and collapse (Primary)  -     Stress Test With Myocardial Perfusion (1 Day); Future  -     US Carotid Bilateral; Future  -     Holter Monitor - 72 Hour Up To 15 Days; Future  Further work-up will be arranged   Discussed and reviewed tilt table testing  Advised if new or worsening symptoms following work-up, return to the ER.  He expresses  understanding  Encourage patient to further discuss ENT/neurology work-up for syncope.    2. Dyspnea, unspecified type  -     Adult Transthoracic Echo Complete W/ Cont if Necessary Per Protocol; Future  Further work-up will be arranged    3. Hyperlipidemia, unspecified hyperlipidemia type  Continue on statin, heart healthy diet    4. Essential hypertension  Controlled, continue current therapy, sodium restrictions, routine monitoring    5. Type 2 diabetes mellitus without complication, without long-term current use of insulin  Encouraging compliance with oral medications and benefit of tight glycemic control    6. Multiple risk factors for coronary artery disease  -     Stress Test With Myocardial Perfusion (1 Day); Future  Further testing will be arranged    7. Tobacco use  Recommend avoidance of tobacco use      Recommend he follow-up with ENT for nasal fracture and further discuss elevated white blood count with PCP.      Follow-up in 4 to 6 weeks to discuss and review testing, sooner if needed

## 2023-04-04 PROBLEM — R55 SYNCOPE AND COLLAPSE: Status: ACTIVE | Noted: 2023-04-04

## 2023-04-04 PROBLEM — Z91.89 MULTIPLE RISK FACTORS FOR CORONARY ARTERY DISEASE: Status: ACTIVE | Noted: 2023-04-04

## 2023-04-10 ENCOUNTER — TREATMENT (OUTPATIENT)
Dept: CARDIOLOGY | Facility: CLINIC | Age: 53
End: 2023-04-10
Payer: COMMERCIAL

## 2023-04-10 ENCOUNTER — TELEPHONE (OUTPATIENT)
Dept: CARDIOLOGY | Facility: CLINIC | Age: 53
End: 2023-04-10
Payer: COMMERCIAL

## 2023-04-10 DIAGNOSIS — R55 SYNCOPE AND COLLAPSE: ICD-10-CM

## 2023-04-10 PROCEDURE — 93248 EXT ECG>7D<15D REV&INTERPJ: CPT | Performed by: INTERNAL MEDICINE

## 2023-04-24 ENCOUNTER — HOSPITAL ENCOUNTER (OUTPATIENT)
Dept: ULTRASOUND IMAGING | Facility: HOSPITAL | Age: 53
Discharge: HOME | End: 2023-04-24
Payer: COMMERCIAL

## 2023-04-24 ENCOUNTER — HOSPITAL ENCOUNTER (OUTPATIENT)
Dept: CARDIOLOGY | Facility: HOSPITAL | Age: 53
Discharge: HOME | End: 2023-04-24
Payer: COMMERCIAL

## 2023-04-24 ENCOUNTER — HOSPITAL ENCOUNTER (OUTPATIENT)
Dept: NUCLEAR MEDICINE | Facility: HOSPITAL | Age: 53
Discharge: HOME | End: 2023-04-24
Payer: COMMERCIAL

## 2023-04-24 DIAGNOSIS — R55 SYNCOPE AND COLLAPSE: ICD-10-CM

## 2023-04-24 DIAGNOSIS — R06.00 DYSPNEA, UNSPECIFIED TYPE: ICD-10-CM

## 2023-04-24 DIAGNOSIS — Z91.89 MULTIPLE RISK FACTORS FOR CORONARY ARTERY DISEASE: ICD-10-CM

## 2023-04-24 LAB
BH CV ECHO MEAS - ACS: 2.1 CM
BH CV ECHO MEAS - AO MAX PG: 9.6 MMHG
BH CV ECHO MEAS - AO MEAN PG: 5 MMHG
BH CV ECHO MEAS - AO ROOT DIAM: 3 CM
BH CV ECHO MEAS - AO V2 MAX: 155 CM/SEC
BH CV ECHO MEAS - AO V2 VTI: 28.4 CM
BH CV ECHO MEAS - EDV(CUBED): 117.6 ML
BH CV ECHO MEAS - EDV(MOD-SP2): 66.6 ML
BH CV ECHO MEAS - EDV(MOD-SP4): 68.5 ML
BH CV ECHO MEAS - EF(MOD-BP): 56.1 %
BH CV ECHO MEAS - EF(MOD-SP2): 52.6 %
BH CV ECHO MEAS - EF(MOD-SP4): 57.7 %
BH CV ECHO MEAS - ESV(CUBED): 32.8 ML
BH CV ECHO MEAS - ESV(MOD-SP2): 31.6 ML
BH CV ECHO MEAS - ESV(MOD-SP4): 29 ML
BH CV ECHO MEAS - FS: 34.7 %
BH CV ECHO MEAS - IVS/LVPW: 0.88 CM
BH CV ECHO MEAS - IVSD: 0.7 CM
BH CV ECHO MEAS - LA DIMENSION: 2.7 CM
BH CV ECHO MEAS - LAT PEAK E' VEL: 15.8 CM/SEC
BH CV ECHO MEAS - LV DIASTOLIC VOL/BSA (35-75): 36.1 CM2
BH CV ECHO MEAS - LV MASS(C)D: 120.8 GRAMS
BH CV ECHO MEAS - LV SYSTOLIC VOL/BSA (12-30): 15.3 CM2
BH CV ECHO MEAS - LVIDD: 4.9 CM
BH CV ECHO MEAS - LVIDS: 3.2 CM
BH CV ECHO MEAS - LVOT AREA: 3.1 CM2
BH CV ECHO MEAS - LVOT DIAM: 2 CM
BH CV ECHO MEAS - LVPWD: 0.8 CM
BH CV ECHO MEAS - MED PEAK E' VEL: 10.6 CM/SEC
BH CV ECHO MEAS - MV A MAX VEL: 105 CM/SEC
BH CV ECHO MEAS - MV DEC SLOPE: 542 CM/SEC2
BH CV ECHO MEAS - MV DEC TIME: 0.16 MSEC
BH CV ECHO MEAS - MV E MAX VEL: 84.7 CM/SEC
BH CV ECHO MEAS - MV E/A: 0.81
BH CV ECHO MEAS - PA ACC TIME: 0.1 SEC
BH CV ECHO MEAS - PA PR(ACCEL): 32.2 MMHG
BH CV ECHO MEAS - RAP SYSTOLE: 10 MMHG
BH CV ECHO MEAS - RVSP: 27.6 MMHG
BH CV ECHO MEAS - SI(MOD-SP2): 18.4 ML/M2
BH CV ECHO MEAS - SI(MOD-SP4): 20.8 ML/M2
BH CV ECHO MEAS - SV(MOD-SP2): 35 ML
BH CV ECHO MEAS - SV(MOD-SP4): 39.5 ML
BH CV ECHO MEAS - TAPSE (>1.6): 2.07 CM
BH CV ECHO MEAS - TR MAX PG: 17.6 MMHG
BH CV ECHO MEAS - TR MAX VEL: 210 CM/SEC
BH CV ECHO MEASUREMENTS AVERAGE E/E' RATIO: 6.42
BH CV NUCLEAR PRIOR STUDY: 3
BH CV REST NUCLEAR ISOTOPE DOSE: 10.1 MCI
BH CV STRESS BP STAGE 1: NORMAL
BH CV STRESS COMMENTS STAGE 1: NORMAL
BH CV STRESS DOSE REGADENOSON STAGE 1: 0.4
BH CV STRESS DURATION MIN STAGE 1: 0
BH CV STRESS DURATION SEC STAGE 1: 10
BH CV STRESS HR STAGE 1: 114
BH CV STRESS NUCLEAR ISOTOPE DOSE: 30.1 MCI
BH CV STRESS PROTOCOL 1: NORMAL
BH CV STRESS RECOVERY BP: NORMAL MMHG
BH CV STRESS RECOVERY HR: 99 BPM
BH CV STRESS STAGE 1: 1
BH CV XLRA - RV BASE: 3 CM
BH CV XLRA - RV LENGTH: 5.6 CM
LEFT ATRIUM VOLUME INDEX: 16.2 ML/M2
LV EF NUC BP: 66 %
MAXIMAL PREDICTED HEART RATE: 167 BPM
MAXIMAL PREDICTED HEART RATE: 167 BPM
PERCENT MAX PREDICTED HR: 68.26 %
STRESS BASELINE BP: NORMAL MMHG
STRESS BASELINE HR: 75 BPM
STRESS PERCENT HR: 80 %
STRESS POST PEAK BP: NORMAL MMHG
STRESS POST PEAK HR: 114 BPM
STRESS TARGET HR: 142 BPM
STRESS TARGET HR: 142 BPM

## 2023-04-24 PROCEDURE — 93017 CV STRESS TEST TRACING ONLY: CPT

## 2023-04-24 PROCEDURE — A9500 TC99M SESTAMIBI: HCPCS | Performed by: NURSE PRACTITIONER

## 2023-04-24 PROCEDURE — 78452 HT MUSCLE IMAGE SPECT MULT: CPT | Performed by: INTERNAL MEDICINE

## 2023-04-24 PROCEDURE — 93306 TTE W/DOPPLER COMPLETE: CPT | Performed by: INTERNAL MEDICINE

## 2023-04-24 PROCEDURE — 25010000002 REGADENOSON 0.4 MG/5ML SOLUTION: Performed by: NURSE PRACTITIONER

## 2023-04-24 PROCEDURE — 93306 TTE W/DOPPLER COMPLETE: CPT

## 2023-04-24 PROCEDURE — 93880 EXTRACRANIAL BILAT STUDY: CPT | Performed by: RADIOLOGY

## 2023-04-24 PROCEDURE — 0 TECHNETIUM SESTAMIBI: Performed by: NURSE PRACTITIONER

## 2023-04-24 PROCEDURE — 93018 CV STRESS TEST I&R ONLY: CPT | Performed by: INTERNAL MEDICINE

## 2023-04-24 PROCEDURE — 93880 EXTRACRANIAL BILAT STUDY: CPT

## 2023-04-24 PROCEDURE — 78452 HT MUSCLE IMAGE SPECT MULT: CPT

## 2023-04-24 RX ORDER — REGADENOSON 0.08 MG/ML
0.4 INJECTION, SOLUTION INTRAVENOUS
Status: COMPLETED | OUTPATIENT
Start: 2023-04-24 | End: 2023-04-24

## 2023-04-24 RX ADMIN — TECHNETIUM TC 99M SESTAMIBI 1 DOSE: 1 INJECTION INTRAVENOUS at 09:03

## 2023-04-24 RX ADMIN — TECHNETIUM TC 99M SESTAMIBI 1 DOSE: 1 INJECTION INTRAVENOUS at 07:45

## 2023-04-24 RX ADMIN — REGADENOSON 0.4 MG: 0.08 INJECTION, SOLUTION INTRAVENOUS at 09:03

## 2023-04-25 ENCOUNTER — TELEPHONE (OUTPATIENT)
Dept: CARDIOLOGY | Facility: CLINIC | Age: 53
End: 2023-04-25
Payer: COMMERCIAL

## 2023-04-25 NOTE — TELEPHONE ENCOUNTER
----- Message from SHAUN Sandoval sent at 4/25/2023 12:59 PM EDT -----  Nuclear stress test without evidence of ischemia     Echocardiogram is ok with a normal LVEF     Carotid US is negative     Will discuss testing further at follow up appt

## 2023-04-25 NOTE — TELEPHONE ENCOUNTER
Called pt and informed him of the following per Nayana Franklin APRN   stress test without evidence of ischemia     Echocardiogram is ok with a normal LVEF     Carotid US is negative     Will discuss testing further at follow up appt

## 2023-05-15 ENCOUNTER — OFFICE VISIT (OUTPATIENT)
Dept: CARDIOLOGY | Facility: CLINIC | Age: 53
End: 2023-05-15
Payer: COMMERCIAL

## 2023-05-15 VITALS
WEIGHT: 156.2 LBS | SYSTOLIC BLOOD PRESSURE: 155 MMHG | BODY MASS INDEX: 21.87 KG/M2 | DIASTOLIC BLOOD PRESSURE: 82 MMHG | HEART RATE: 98 BPM | HEIGHT: 71 IN | OXYGEN SATURATION: 99 %

## 2023-05-15 DIAGNOSIS — R00.0 TACHYCARDIA: ICD-10-CM

## 2023-05-15 DIAGNOSIS — D64.9 ANEMIA, UNSPECIFIED TYPE: ICD-10-CM

## 2023-05-15 DIAGNOSIS — Z72.0 TOBACCO USE: ICD-10-CM

## 2023-05-15 DIAGNOSIS — E78.5 HYPERLIPIDEMIA, UNSPECIFIED HYPERLIPIDEMIA TYPE: ICD-10-CM

## 2023-05-15 DIAGNOSIS — R55 SYNCOPE AND COLLAPSE: Primary | ICD-10-CM

## 2023-05-15 DIAGNOSIS — I51.89 DIASTOLIC DYSFUNCTION: ICD-10-CM

## 2023-05-15 PROCEDURE — 99214 OFFICE O/P EST MOD 30 MIN: CPT | Performed by: NURSE PRACTITIONER

## 2023-05-15 RX ORDER — AMLODIPINE BESYLATE 2.5 MG/1
2.5 TABLET ORAL DAILY
Qty: 30 TABLET
Start: 2023-05-15

## 2023-05-15 RX ORDER — METOPROLOL SUCCINATE 25 MG/1
25 TABLET, EXTENDED RELEASE ORAL DAILY
Qty: 30 TABLET | Refills: 1 | Status: SHIPPED | OUTPATIENT
Start: 2023-05-15

## 2023-05-15 RX ORDER — FERROUS SULFATE TAB EC 324 MG (65 MG FE EQUIVALENT) 324 (65 FE) MG
324 TABLET DELAYED RESPONSE ORAL
COMMUNITY

## 2023-05-15 NOTE — LETTER
May 22, 2023     SHAUN Dumont  [address] [facility] [address]    Patient: Serge Chandra   YOB: 1970   Date of Visit: 5/15/2023       Dear SHAUN Dumont    Serge Chandra was in my office today. Below is a copy of my note.    If you have questions, please do not hesitate to call me. I look forward to following Serge along with you.         Sincerely,        SHAUN Murillo        CC: No Recipients    Subjective     Serge Chandra is a 52 y.o. male.   Chief Complaint   Patient presents with   • Results     Pt here for echo and stress test results      History of Present Illness   Serge Chandra is a 52-year-old male who presents to clinic today for cardiology follow-up.  He is accompanied by his spouse.    He was seen for syncopal episodes and has completed cardiac work-up which he would like to discuss today.   He continues to complain of intermittent dizziness but denies any further episodes of syncope.  He had previously completed tilt table testing which was negative for neurocardiogenic syncope, POTS or orthostatic syncope.  There was noted heart rate changes during testing without clinically significant changes in blood pressure or reported symptoms.  He continues to report intermittent elevated heart rates with home monitoring generally around 100.  He has seen ENT and per patient report questionable Ménière's disease and he is awaiting other work-up.  He reports he was advised at ENT appointment that he had hearing loss.  He has not seen neurology but plans to do so. He denies any chest discomfort, dyspnea, palpitations, bradycardia, nausea/vomiting, dizziness, or further episodes of syncope.      Hypertension currently on Norvasc 2.5 mg daily, he only takes if BP is > 140. Home readings are stable. He denies hypotension.  He denies chest pain or dyspnea.     Hyperlipidemia on Lipitor.  He does not take it on a daily basis.  He denies medication side effects.  He  continues to smoke.     Underlying DM currently treated by PCP on oral medications. He reports HgA1c is 7.7.     He reports diagnosis of anemia with a drop in his hemoglobin and is currently on iron supplements, managed by PCP.       Patient Active Problem List   Diagnosis   • Essential hypertension   • Hyperlipidemia   • Type 2 diabetes mellitus without complication, without long-term current use of insulin   • Tobacco use   • Multiple risk factors for coronary artery disease   • Syncope and collapse   • Fracture of rib of right side with routine healing     Past Medical History:   Diagnosis Date   • Diabetes mellitus 07/2010   • GERD (gastroesophageal reflux disease)    • Hyperlipidemia 10/27/2022   • Hypertension 09/2021   • Kidney stone      Past Surgical History:   Procedure Laterality Date   • KIDNEY SURGERY         Family History   Problem Relation Age of Onset   • COPD Mother    • Lung cancer Mother    • Arthritis Mother    • Diabetes Father    • Stroke Father    • Parkinsonism Father    • Hypertension Brother    • Hypertension Brother    • Heart attack Maternal Grandfather    • Stroke Paternal Grandmother      Social History     Tobacco Use   • Smoking status: Every Day     Packs/day: 0.50     Years: 30.00     Pack years: 15.00     Types: Cigarettes   • Smokeless tobacco: Never   Vaping Use   • Vaping Use: Never used   Substance Use Topics   • Alcohol use: Not Currently   • Drug use: Not Currently         The following portions of the patient's history were reviewed and updated as appropriate: allergies, current medications, past family history, past medical history, past social history, past surgical history and problem list.    Allergies   Allergen Reactions   • Bee Venom Swelling     States yellow jackets         Current Outpatient Medications:   •  amLODIPine (NORVASC) 2.5 MG tablet, Take 1 tablet by mouth Daily. Take if BP > 140, Disp: 30 tablet, Rfl:   •  atorvastatin (LIPITOR) 20 MG tablet, Take 1  tablet by mouth Daily., Disp: 30 tablet, Rfl: 1  •  ferrous sulfate 324 (65 Fe) MG tablet delayed-release EC tablet, Take 1 tablet by mouth Daily With Breakfast., Disp: , Rfl:   •  gabapentin (NEURONTIN) 300 MG capsule, Take 1 capsule by mouth 2 (Two) Times a Day., Disp: , Rfl:   •  glyburide (DIAbeta) 5 MG tablet, Take 2 tablets by mouth 2 (Two) Times a Day., Disp: , Rfl:   •  metFORMIN (GLUCOPHAGE) 1000 MG tablet, Take 1 tablet by mouth 2 (Two) Times a Day., Disp: , Rfl:   •  ofloxacin (FLOXIN) 0.3 % otic solution, As Needed., Disp: , Rfl:   •  pantoprazole (PROTONIX) 40 MG EC tablet, Take 1 tablet by mouth Daily., Disp: , Rfl:   •  metoprolol succinate XL (TOPROL-XL) 25 MG 24 hr tablet, Take 1 tablet by mouth Daily., Disp: 30 tablet, Rfl: 1    Review of Systems   Constitutional: Negative for activity change, appetite change, chills, diaphoresis, fatigue and fever.   HENT: Negative for congestion, drooling, ear discharge, ear pain, mouth sores, nosebleeds, postnasal drip, rhinorrhea, sinus pressure, sneezing and sore throat.    Eyes: Negative for pain, discharge and visual disturbance.   Respiratory: Negative for cough, chest tightness, shortness of breath and wheezing.    Cardiovascular: Negative for chest pain, palpitations and leg swelling.   Gastrointestinal: Negative for abdominal pain, constipation, diarrhea, nausea and vomiting.   Endocrine: Negative for cold intolerance, heat intolerance, polydipsia, polyphagia and polyuria.   Musculoskeletal: Negative for arthralgias, myalgias and neck pain.   Skin: Negative for rash and wound.   Neurological: Positive for dizziness. Negative for syncope, speech difficulty, weakness, light-headedness and headaches.   Hematological: Negative for adenopathy. Does not bruise/bleed easily.   Psychiatric/Behavioral: Negative for confusion, dysphoric mood and sleep disturbance. The patient is not nervous/anxious.    All other systems reviewed and are negative.    /82 (BP  "Location: Left arm, Patient Position: Sitting, Cuff Size: Adult)   Pulse 98   Ht 180.3 cm (70.98\")   Wt 70.9 kg (156 lb 3.2 oz)   SpO2 99%   BMI 21.80 kg/m²     Objective   Allergies   Allergen Reactions   • Bee Venom Swelling     States yellow jackets       Physical Exam  Vitals reviewed.   Constitutional:       Appearance: Normal appearance. He is well-developed.   HENT:      Head: Normocephalic.   Eyes:      Conjunctiva/sclera: Conjunctivae normal.   Neck:      Thyroid: No thyromegaly.      Vascular: No carotid bruit or JVD.   Cardiovascular:      Rate and Rhythm: Normal rate and regular rhythm.   Pulmonary:      Effort: Pulmonary effort is normal.      Breath sounds: Normal breath sounds.   Musculoskeletal:      Cervical back: Neck supple.      Right lower leg: No edema.      Left lower leg: No edema.   Skin:     General: Skin is warm and dry.   Neurological:      Mental Status: He is alert and oriented to person, place, and time.   Psychiatric:         Attention and Perception: Attention normal.         Mood and Affect: Mood normal.         Speech: Speech normal.         Behavior: Behavior normal. Behavior is cooperative.         Cognition and Memory: Cognition normal.         LABS  WBC   Date Value Ref Range Status   02/06/2023 20.61 (H) 3.40 - 10.80 10*3/mm3 Final     RBC   Date Value Ref Range Status   02/06/2023 4.68 4.14 - 5.80 10*6/mm3 Final     Hemoglobin   Date Value Ref Range Status   02/06/2023 14.4 13.0 - 17.7 g/dL Final     Hematocrit   Date Value Ref Range Status   02/06/2023 43.2 37.5 - 51.0 % Final     MCV   Date Value Ref Range Status   02/06/2023 92.3 79.0 - 97.0 fL Final     MCH   Date Value Ref Range Status   02/06/2023 30.8 26.6 - 33.0 pg Final     MCHC   Date Value Ref Range Status   02/06/2023 33.3 31.5 - 35.7 g/dL Final     RDW   Date Value Ref Range Status   02/06/2023 13.0 12.3 - 15.4 % Final     RDW-SD   Date Value Ref Range Status   02/06/2023 43.7 37.0 - 54.0 fl Final     MPV "   Date Value Ref Range Status   02/06/2023 10.8 6.0 - 12.0 fL Final     Platelets   Date Value Ref Range Status   02/06/2023 240 140 - 450 10*3/mm3 Final     Neutrophil %   Date Value Ref Range Status   02/06/2023 89.0 (H) 42.7 - 76.0 % Final     Lymphocyte %   Date Value Ref Range Status   02/06/2023 4.5 (L) 19.6 - 45.3 % Final     Monocyte %   Date Value Ref Range Status   02/06/2023 5.3 5.0 - 12.0 % Final     Eosinophil %   Date Value Ref Range Status   02/06/2023 0.1 (L) 0.3 - 6.2 % Final     Basophil %   Date Value Ref Range Status   02/06/2023 0.4 0.0 - 1.5 % Final     Immature Grans %   Date Value Ref Range Status   02/06/2023 0.7 (H) 0.0 - 0.5 % Final     Neutrophils, Absolute   Date Value Ref Range Status   02/06/2023 18.34 (H) 1.70 - 7.00 10*3/mm3 Final     Lymphocytes, Absolute   Date Value Ref Range Status   02/06/2023 0.92 0.70 - 3.10 10*3/mm3 Final     Monocytes, Absolute   Date Value Ref Range Status   02/06/2023 1.10 (H) 0.10 - 0.90 10*3/mm3 Final     Eosinophils, Absolute   Date Value Ref Range Status   02/06/2023 0.02 0.00 - 0.40 10*3/mm3 Final     Basophils, Absolute   Date Value Ref Range Status   02/06/2023 0.08 0.00 - 0.20 10*3/mm3 Final     Immature Grans, Absolute   Date Value Ref Range Status   02/06/2023 0.15 (H) 0.00 - 0.05 10*3/mm3 Final     nRBC   Date Value Ref Range Status   02/06/2023 0.0 0.0 - 0.2 /100 WBC Final       No results found for: CHOL, TRIG, HDL, LDL    IMAGING     XR Ribs Right With PA Chest    Result Date: 2/6/2023  No acute findings in the chest or right ribs.  This report was finalized on 2/6/2023 3:55 PM by Dr. Mason Gupta MD.      CT Head Without Contrast    Result Date: 2/6/2023  Normal, negative unenhanced head CT. Signer Name: Jasper Galvin MD  Signed: 2/6/2023 7:18 PM  Workstation Name: [id number]  [facility]    CT Cervical Spine Without Contrast    Result Date: 2/6/2023  Midcervical degenerative disc disease. No fracture seen. Signer  Name: Jasper Galvin MD  Signed: 2/6/2023 7:21 PM  Workstation Name: [id number]  [facility]    US Carotid Bilateral    Result Date: 4/24/2023  Impression:  No evidence of hemodynamically significant plaques or stenosis within the carotid system at this time.  This report was finalized on 4/24/2023 8:38 AM by Dr. Carlo Washington MD.      CT Facial Bones Without Contrast    Result Date: 2/6/2023  Comminuted, displaced and depressed nasal fracture. No additional facial fractures are seen. Signer Name: Jasper Galvin MD  Signed: 2/6/2023 7:24 PM  Workstation Name: [id number]  [facility]    Tilt table 2/23/2023  Interpretation Summary       •  Tilt table test was negative for neurocardiogenic syncope, postural orthostatic tachycardia syndrome and orthostatic syncope. There was an increase in heart rate from 112 to a peak of 141 after 36 minutes of being upright without clinically significant changes in blood pressure or reported symptoms.           Echocardiogram 4/24/2023    Interpretation Summary       •  Normal left ventricular cavity size, wall thickness and wall motion noted.  •  Left ventricular systolic function is normal. Left ventricular ejection fraction appears to be 61 - 65%.  •  Left ventricular diastolic function is consistent with (grade I) impaired relaxation.  •  No significant valvular heart disease noted.  •  No pericardial effusion detected.       Nuclear Stress Test 4/24/2023  Interpretation Summary       •  A pharmacological stress test was performed using regadenoson without low-level exercise.  •  Resting EKG showed sinus rhythm at a rate of 75 bpm.  EKG was normal.  •  ST segments did not show any diagnostic changes.  No significant arrhythmia detected.  •  Left ventricular ejection fraction is normal (Calculated EF = 66%).  •  Myocardial perfusion imaging indicates a normal myocardial perfusion study with no evidence of ischemia.  •  Impressions  are consistent with a low risk study.  •  There is no prior study available for comparison.           Assessment & Plan   Diagnoses and all orders for this visit:    1. Syncope and collapse (Primary)  Patient denies any recent episodes.  Continue to monitor, discussed and reviewed all testing.  Recommend follow-up with ENT and/or neurology.    2. Tachycardia  -     TSH; Future  -     T4, Free; Future  Discussed and reviewed Holter monitor  Labs recommended and initiate Toprol 25 mg daily, monitor vital signs, call the clinic with concerns    3. Hyperlipidemia, unspecified hyperlipidemia type  Continue on statin, heart healthy diet, labs managed by PCP    4. Diastolic dysfunction  -     BNP; Future  Labs with results pending    5. Anemia, unspecified type  -     CBC & Differential; Future  Recommend close follow-up with PCP    6. Tobacco use  Recommend avoidance of tobacco products    Other orders  -     amLODIPine (NORVASC) 2.5 MG tablet; Take 1 tablet by mouth Daily. Take if BP > 140  Dispense: 30 tablet  -     metoprolol succinate XL (TOPROL-XL) 25 MG 24 hr tablet; Take 1 tablet by mouth Daily.  Dispense: 30 tablet; Refill: 1      Discussed and reviewed nuclear stress test and echocardiogram    Lifestyle modifications including heart healthy diet, regular exercise, maintenance of desirable body weight and avoidance of tobacco products.      Follow-up in 1 month, sooner if needed

## 2023-05-15 NOTE — PROGRESS NOTES
Subjective     Serge Chandra is a 52 y.o. male.   Chief Complaint   Patient presents with   • Results     Pt here for echo and stress test results      History of Present Illness   Serge Chandra is a 52-year-old male who presents to clinic today for cardiology follow-up.  He is accompanied by his spouse.    He was seen for syncopal episodes and has completed cardiac work-up which he would like to discuss today.   He continues to complain of intermittent dizziness but denies any further episodes of syncope.  He had previously completed tilt table testing which was negative for neurocardiogenic syncope, POTS or orthostatic syncope.  There was noted heart rate changes during testing without clinically significant changes in blood pressure or reported symptoms.  He continues to report intermittent elevated heart rates with home monitoring generally around 100.  He has seen ENT and per patient report questionable Ménière's disease and he is awaiting other work-up.  He reports he was advised at ENT appointment that he had hearing loss.  He has not seen neurology but plans to do so. He denies any chest discomfort, dyspnea, palpitations, bradycardia, nausea/vomiting, dizziness, or further episodes of syncope.      Hypertension currently on Norvasc 2.5 mg daily, he only takes if BP is > 140. Home readings are stable. He denies hypotension.  He denies chest pain or dyspnea.     Hyperlipidemia on Lipitor.  He does not take it on a daily basis.  He denies medication side effects.  He continues to smoke.     Underlying DM currently treated by PCP on oral medications. He reports HgA1c is 7.7.     He reports diagnosis of anemia with a drop in his hemoglobin and is currently on iron supplements, managed by PCP.       Patient Active Problem List   Diagnosis   • Essential hypertension   • Hyperlipidemia   • Type 2 diabetes mellitus without complication, without long-term current use of insulin   • Tobacco use   • Multiple risk  factors for coronary artery disease   • Syncope and collapse   • Fracture of rib of right side with routine healing     Past Medical History:   Diagnosis Date   • Diabetes mellitus 07/2010   • GERD (gastroesophageal reflux disease)    • Hyperlipidemia 10/27/2022   • Hypertension 09/2021   • Kidney stone      Past Surgical History:   Procedure Laterality Date   • KIDNEY SURGERY         Family History   Problem Relation Age of Onset   • COPD Mother    • Lung cancer Mother    • Arthritis Mother    • Diabetes Father    • Stroke Father    • Parkinsonism Father    • Hypertension Brother    • Hypertension Brother    • Heart attack Maternal Grandfather    • Stroke Paternal Grandmother      Social History     Tobacco Use   • Smoking status: Every Day     Packs/day: 0.50     Years: 30.00     Pack years: 15.00     Types: Cigarettes   • Smokeless tobacco: Never   Vaping Use   • Vaping Use: Never used   Substance Use Topics   • Alcohol use: Not Currently   • Drug use: Not Currently         The following portions of the patient's history were reviewed and updated as appropriate: allergies, current medications, past family history, past medical history, past social history, past surgical history and problem list.    Allergies   Allergen Reactions   • Bee Venom Swelling     States yellow jackets         Current Outpatient Medications:   •  amLODIPine (NORVASC) 2.5 MG tablet, Take 1 tablet by mouth Daily. Take if BP > 140, Disp: 30 tablet, Rfl:   •  atorvastatin (LIPITOR) 20 MG tablet, Take 1 tablet by mouth Daily., Disp: 30 tablet, Rfl: 1  •  ferrous sulfate 324 (65 Fe) MG tablet delayed-release EC tablet, Take 1 tablet by mouth Daily With Breakfast., Disp: , Rfl:   •  gabapentin (NEURONTIN) 300 MG capsule, Take 1 capsule by mouth 2 (Two) Times a Day., Disp: , Rfl:   •  glyburide (DIAbeta) 5 MG tablet, Take 2 tablets by mouth 2 (Two) Times a Day., Disp: , Rfl:   •  metFORMIN (GLUCOPHAGE) 1000 MG tablet, Take 1 tablet by mouth 2  "(Two) Times a Day., Disp: , Rfl:   •  ofloxacin (FLOXIN) 0.3 % otic solution, As Needed., Disp: , Rfl:   •  pantoprazole (PROTONIX) 40 MG EC tablet, Take 1 tablet by mouth Daily., Disp: , Rfl:   •  metoprolol succinate XL (TOPROL-XL) 25 MG 24 hr tablet, Take 1 tablet by mouth Daily., Disp: 30 tablet, Rfl: 1    Review of Systems   Constitutional: Negative for activity change, appetite change, chills, diaphoresis, fatigue and fever.   HENT: Negative for congestion, drooling, ear discharge, ear pain, mouth sores, nosebleeds, postnasal drip, rhinorrhea, sinus pressure, sneezing and sore throat.    Eyes: Negative for pain, discharge and visual disturbance.   Respiratory: Negative for cough, chest tightness, shortness of breath and wheezing.    Cardiovascular: Negative for chest pain, palpitations and leg swelling.   Gastrointestinal: Negative for abdominal pain, constipation, diarrhea, nausea and vomiting.   Endocrine: Negative for cold intolerance, heat intolerance, polydipsia, polyphagia and polyuria.   Musculoskeletal: Negative for arthralgias, myalgias and neck pain.   Skin: Negative for rash and wound.   Neurological: Positive for dizziness. Negative for syncope, speech difficulty, weakness, light-headedness and headaches.   Hematological: Negative for adenopathy. Does not bruise/bleed easily.   Psychiatric/Behavioral: Negative for confusion, dysphoric mood and sleep disturbance. The patient is not nervous/anxious.    All other systems reviewed and are negative.    /82 (BP Location: Left arm, Patient Position: Sitting, Cuff Size: Adult)   Pulse 98   Ht 180.3 cm (70.98\")   Wt 70.9 kg (156 lb 3.2 oz)   SpO2 99%   BMI 21.80 kg/m²     Objective   Allergies   Allergen Reactions   • Bee Venom Swelling     States yellow jackets       Physical Exam  Vitals reviewed.   Constitutional:       Appearance: Normal appearance. He is well-developed.   HENT:      Head: Normocephalic.   Eyes:      Conjunctiva/sclera: " Conjunctivae normal.   Neck:      Thyroid: No thyromegaly.      Vascular: No carotid bruit or JVD.   Cardiovascular:      Rate and Rhythm: Normal rate and regular rhythm.   Pulmonary:      Effort: Pulmonary effort is normal.      Breath sounds: Normal breath sounds.   Musculoskeletal:      Cervical back: Neck supple.      Right lower leg: No edema.      Left lower leg: No edema.   Skin:     General: Skin is warm and dry.   Neurological:      Mental Status: He is alert and oriented to person, place, and time.   Psychiatric:         Attention and Perception: Attention normal.         Mood and Affect: Mood normal.         Speech: Speech normal.         Behavior: Behavior normal. Behavior is cooperative.         Cognition and Memory: Cognition normal.         LABS  WBC   Date Value Ref Range Status   02/06/2023 20.61 (H) 3.40 - 10.80 10*3/mm3 Final     RBC   Date Value Ref Range Status   02/06/2023 4.68 4.14 - 5.80 10*6/mm3 Final     Hemoglobin   Date Value Ref Range Status   02/06/2023 14.4 13.0 - 17.7 g/dL Final     Hematocrit   Date Value Ref Range Status   02/06/2023 43.2 37.5 - 51.0 % Final     MCV   Date Value Ref Range Status   02/06/2023 92.3 79.0 - 97.0 fL Final     MCH   Date Value Ref Range Status   02/06/2023 30.8 26.6 - 33.0 pg Final     MCHC   Date Value Ref Range Status   02/06/2023 33.3 31.5 - 35.7 g/dL Final     RDW   Date Value Ref Range Status   02/06/2023 13.0 12.3 - 15.4 % Final     RDW-SD   Date Value Ref Range Status   02/06/2023 43.7 37.0 - 54.0 fl Final     MPV   Date Value Ref Range Status   02/06/2023 10.8 6.0 - 12.0 fL Final     Platelets   Date Value Ref Range Status   02/06/2023 240 140 - 450 10*3/mm3 Final     Neutrophil %   Date Value Ref Range Status   02/06/2023 89.0 (H) 42.7 - 76.0 % Final     Lymphocyte %   Date Value Ref Range Status   02/06/2023 4.5 (L) 19.6 - 45.3 % Final     Monocyte %   Date Value Ref Range Status   02/06/2023 5.3 5.0 - 12.0 % Final     Eosinophil %   Date Value  Ref Range Status   02/06/2023 0.1 (L) 0.3 - 6.2 % Final     Basophil %   Date Value Ref Range Status   02/06/2023 0.4 0.0 - 1.5 % Final     Immature Grans %   Date Value Ref Range Status   02/06/2023 0.7 (H) 0.0 - 0.5 % Final     Neutrophils, Absolute   Date Value Ref Range Status   02/06/2023 18.34 (H) 1.70 - 7.00 10*3/mm3 Final     Lymphocytes, Absolute   Date Value Ref Range Status   02/06/2023 0.92 0.70 - 3.10 10*3/mm3 Final     Monocytes, Absolute   Date Value Ref Range Status   02/06/2023 1.10 (H) 0.10 - 0.90 10*3/mm3 Final     Eosinophils, Absolute   Date Value Ref Range Status   02/06/2023 0.02 0.00 - 0.40 10*3/mm3 Final     Basophils, Absolute   Date Value Ref Range Status   02/06/2023 0.08 0.00 - 0.20 10*3/mm3 Final     Immature Grans, Absolute   Date Value Ref Range Status   02/06/2023 0.15 (H) 0.00 - 0.05 10*3/mm3 Final     nRBC   Date Value Ref Range Status   02/06/2023 0.0 0.0 - 0.2 /100 WBC Final       No results found for: CHOL, TRIG, HDL, LDL    IMAGING     XR Ribs Right With PA Chest    Result Date: 2/6/2023  No acute findings in the chest or right ribs.  This report was finalized on 2/6/2023 3:55 PM by Dr. Mason Gupta MD.      CT Head Without Contrast    Result Date: 2/6/2023  Normal, negative unenhanced head CT. Signer Name: Jasper Galvin MD  Signed: 2/6/2023 7:18 PM  Workstation Name: [facility]  [facility]    CT Cervical Spine Without Contrast    Result Date: 2/6/2023  Midcervical degenerative disc disease. No fracture seen. Signer Name: Jasper Galvin MD  Signed: 2/6/2023 7:21 PM  Workstation Name: [facility]  [facility]    US Carotid Bilateral    Result Date: 4/24/2023  Impression:  No evidence of hemodynamically significant plaques or stenosis within the carotid system at this time.  This report was finalized on 4/24/2023 8:38 AM by Dr. Carlo Washington MD.      CT Facial Bones Without Contrast    Result Date: 2/6/2023  Comminuted, displaced  and depressed nasal fracture. No additional facial fractures are seen. Signer Name: Jasper Galvin MD  Signed: 2/6/2023 7:24 PM  Workstation Name: [id number]  [facility]    Tilt table 2/23/2023  Interpretation Summary       •  Tilt table test was negative for neurocardiogenic syncope, postural orthostatic tachycardia syndrome and orthostatic syncope. There was an increase in heart rate from 112 to a peak of 141 after 36 minutes of being upright without clinically significant changes in blood pressure or reported symptoms.           Echocardiogram 4/24/2023    Interpretation Summary       •  Normal left ventricular cavity size, wall thickness and wall motion noted.  •  Left ventricular systolic function is normal. Left ventricular ejection fraction appears to be 61 - 65%.  •  Left ventricular diastolic function is consistent with (grade I) impaired relaxation.  •  No significant valvular heart disease noted.  •  No pericardial effusion detected.       Nuclear Stress Test 4/24/2023  Interpretation Summary       •  A pharmacological stress test was performed using regadenoson without low-level exercise.  •  Resting EKG showed sinus rhythm at a rate of 75 bpm.  EKG was normal.  •  ST segments did not show any diagnostic changes.  No significant arrhythmia detected.  •  Left ventricular ejection fraction is normal (Calculated EF = 66%).  •  Myocardial perfusion imaging indicates a normal myocardial perfusion study with no evidence of ischemia.  •  Impressions are consistent with a low risk study.  •  There is no prior study available for comparison.           Assessment & Plan   Diagnoses and all orders for this visit:    1. Syncope and collapse (Primary)  Patient denies any recent episodes.  Continue to monitor, discussed and reviewed all testing.  Recommend follow-up with ENT and/or neurology.    2. Tachycardia  -     TSH; Future  -     T4, Free; Future  Discussed and reviewed Holter  monitor  Labs recommended and initiate Toprol 25 mg daily, monitor vital signs, call the clinic with concerns    3. Hyperlipidemia, unspecified hyperlipidemia type  Continue on statin, heart healthy diet, labs managed by PCP    4. Diastolic dysfunction  -     BNP; Future  Labs with results pending    5. Anemia, unspecified type  -     CBC & Differential; Future  Recommend close follow-up with PCP    6. Tobacco use  Recommend avoidance of tobacco products    Other orders  -     amLODIPine (NORVASC) 2.5 MG tablet; Take 1 tablet by mouth Daily. Take if BP > 140  Dispense: 30 tablet  -     metoprolol succinate XL (TOPROL-XL) 25 MG 24 hr tablet; Take 1 tablet by mouth Daily.  Dispense: 30 tablet; Refill: 1      Discussed and reviewed nuclear stress test and echocardiogram    Lifestyle modifications including heart healthy diet, regular exercise, maintenance of desirable body weight and avoidance of tobacco products.      Follow-up in 1 month, sooner if needed

## 2023-05-17 ENCOUNTER — TRANSCRIBE ORDERS (OUTPATIENT)
Dept: ADMINISTRATIVE | Facility: HOSPITAL | Age: 53
End: 2023-05-17
Payer: COMMERCIAL

## 2023-05-17 ENCOUNTER — OFFICE VISIT (OUTPATIENT)
Dept: CARDIAC SURGERY | Facility: CLINIC | Age: 53
End: 2023-05-17
Payer: COMMERCIAL

## 2023-05-17 VITALS
HEART RATE: 111 BPM | DIASTOLIC BLOOD PRESSURE: 89 MMHG | BODY MASS INDEX: 20.86 KG/M2 | HEIGHT: 72 IN | SYSTOLIC BLOOD PRESSURE: 138 MMHG | OXYGEN SATURATION: 99 % | TEMPERATURE: 97.7 F | WEIGHT: 154 LBS

## 2023-05-17 DIAGNOSIS — H91.8X3 OTHER SPECIFIED HEARING LOSS, BILATERAL: Primary | ICD-10-CM

## 2023-05-17 DIAGNOSIS — S22.41XG CLOSED FRACTURE OF MULTIPLE RIBS OF RIGHT SIDE WITH DELAYED HEALING, SUBSEQUENT ENCOUNTER: Primary | ICD-10-CM

## 2023-05-17 PROBLEM — S22.31XD: Status: ACTIVE | Noted: 2023-05-17
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
## 2023-05-18 DIAGNOSIS — Z00.6 EXAMINATION FOR NORMAL COMPARISON FOR CLINICAL RESEARCH: Primary | ICD-10-CM

## 2023-05-19 DIAGNOSIS — S22.41XD CLOSED FRACTURE OF MULTIPLE RIBS OF RIGHT SIDE WITH ROUTINE HEALING, SUBSEQUENT ENCOUNTER: Primary | ICD-10-CM

## 2023-05-23 PROBLEM — S22.31XG: Status: ACTIVE | Noted: 2023-05-17

## 2023-05-25 ENCOUNTER — TRANSCRIBE ORDERS (OUTPATIENT)
Dept: ADMINISTRATIVE | Facility: HOSPITAL | Age: 53
End: 2023-05-25
Payer: COMMERCIAL

## 2023-05-25 DIAGNOSIS — D64.9 ANEMIA, UNSPECIFIED TYPE: Primary | ICD-10-CM

## 2023-06-07 ENCOUNTER — HOSPITAL ENCOUNTER (OUTPATIENT)
Dept: CT IMAGING | Facility: HOSPITAL | Age: 53
Discharge: HOME | End: 2023-06-07
Payer: COMMERCIAL

## 2023-06-07 DIAGNOSIS — S22.41XD CLOSED FRACTURE OF MULTIPLE RIBS OF RIGHT SIDE WITH ROUTINE HEALING, SUBSEQUENT ENCOUNTER: ICD-10-CM

## 2023-06-07 DIAGNOSIS — D64.9 ANEMIA, UNSPECIFIED TYPE: ICD-10-CM

## 2023-06-07 LAB — CREAT BLDA-MCNC: 0.8 MG/DL (ref 0.6–1.3)

## 2023-06-07 PROCEDURE — 71270 CT THORAX DX C-/C+: CPT

## 2023-06-07 PROCEDURE — 25510000001 IOPAMIDOL 61 % SOLUTION: Performed by: NURSE PRACTITIONER

## 2023-06-07 PROCEDURE — 74178 CT ABD&PLV WO CNTR FLWD CNTR: CPT

## 2023-06-07 PROCEDURE — 74178 CT ABD&PLV WO CNTR FLWD CNTR: CPT | Performed by: RADIOLOGY

## 2023-06-07 PROCEDURE — 82565 ASSAY OF CREATININE: CPT

## 2023-06-07 RX ADMIN — IOPAMIDOL 89 ML: 612 INJECTION, SOLUTION INTRAVENOUS at 08:45

## 2023-06-08 ENCOUNTER — LAB (OUTPATIENT)
Dept: LAB | Facility: HOSPITAL | Age: 53
End: 2023-06-08
Payer: COMMERCIAL

## 2023-06-08 ENCOUNTER — HOSPITAL ENCOUNTER (OUTPATIENT)
Dept: MRI IMAGING | Facility: HOSPITAL | Age: 53
Discharge: HOME | End: 2023-06-08
Payer: COMMERCIAL

## 2023-06-08 DIAGNOSIS — D64.9 ANEMIA, UNSPECIFIED TYPE: ICD-10-CM

## 2023-06-08 DIAGNOSIS — I51.89 DIASTOLIC DYSFUNCTION: ICD-10-CM

## 2023-06-08 DIAGNOSIS — R00.0 TACHYCARDIA: ICD-10-CM

## 2023-06-08 DIAGNOSIS — H91.8X3 OTHER SPECIFIED HEARING LOSS, BILATERAL: ICD-10-CM

## 2023-06-08 PROCEDURE — 84439 ASSAY OF FREE THYROXINE: CPT

## 2023-06-08 PROCEDURE — 84443 ASSAY THYROID STIM HORMONE: CPT

## 2023-06-08 PROCEDURE — 83880 ASSAY OF NATRIURETIC PEPTIDE: CPT

## 2023-06-08 PROCEDURE — 85025 COMPLETE CBC W/AUTO DIFF WBC: CPT

## 2023-06-08 PROCEDURE — 36415 COLL VENOUS BLD VENIPUNCTURE: CPT

## 2023-06-09 ENCOUNTER — TELEPHONE (OUTPATIENT)
Dept: CARDIOLOGY | Facility: CLINIC | Age: 53
End: 2023-06-09
Payer: COMMERCIAL

## 2023-06-09 LAB
BASOPHILS # BLD AUTO: 0.06 10*3/MM3 (ref 0–0.2)
BASOPHILS NFR BLD AUTO: 0.9 % (ref 0–1.5)
DEPRECATED RDW RBC AUTO: 50 FL (ref 37–54)
EOSINOPHIL # BLD AUTO: 0.21 10*3/MM3 (ref 0–0.4)
EOSINOPHIL NFR BLD AUTO: 3 % (ref 0.3–6.2)
ERYTHROCYTE [DISTWIDTH] IN BLOOD BY AUTOMATED COUNT: 15.5 % (ref 12.3–15.4)
HCT VFR BLD AUTO: 39.8 % (ref 37.5–51)
HGB BLD-MCNC: 13 G/DL (ref 13–17.7)
IMM GRANULOCYTES # BLD AUTO: 0.02 10*3/MM3 (ref 0–0.05)
IMM GRANULOCYTES NFR BLD AUTO: 0.3 % (ref 0–0.5)
LYMPHOCYTES # BLD AUTO: 1.81 10*3/MM3 (ref 0.7–3.1)
LYMPHOCYTES NFR BLD AUTO: 25.8 % (ref 19.6–45.3)
MCH RBC QN AUTO: 28.6 PG (ref 26.6–33)
MCHC RBC AUTO-ENTMCNC: 32.7 G/DL (ref 31.5–35.7)
MCV RBC AUTO: 87.7 FL (ref 79–97)
MONOCYTES # BLD AUTO: 0.36 10*3/MM3 (ref 0.1–0.9)
MONOCYTES NFR BLD AUTO: 5.1 % (ref 5–12)
NEUTROPHILS NFR BLD AUTO: 4.56 10*3/MM3 (ref 1.7–7)
NEUTROPHILS NFR BLD AUTO: 64.9 % (ref 42.7–76)
NRBC BLD AUTO-RTO: 0 /100 WBC (ref 0–0.2)
NT-PROBNP SERPL-MCNC: 10.2 PG/ML (ref 0–900)
PLATELET # BLD AUTO: 222 10*3/MM3 (ref 140–450)
PMV BLD AUTO: 12.6 FL (ref 6–12)
RBC # BLD AUTO: 4.54 10*6/MM3 (ref 4.14–5.8)
T4 FREE SERPL-MCNC: 1.11 NG/DL (ref 0.93–1.7)
TSH SERPL DL<=0.05 MIU/L-ACNC: 1.36 UIU/ML (ref 0.27–4.2)
WBC NRBC COR # BLD: 7.02 10*3/MM3 (ref 3.4–10.8)

## 2023-06-09 NOTE — TELEPHONE ENCOUNTER
Called pt and informed him of the following per Nayana Franklin APRN   Labs stable, will discuss further at follow up appt

## 2023-06-09 NOTE — TELEPHONE ENCOUNTER
----- Message from SHAUN Sandoval sent at 6/9/2023  8:17 AM EDT -----  Labs stable, will discuss further at follow up appt

## 2023-06-13 ENCOUNTER — OFFICE VISIT (OUTPATIENT)
Dept: CARDIAC SURGERY | Facility: CLINIC | Age: 53
End: 2023-06-13
Payer: COMMERCIAL

## 2023-06-13 VITALS
HEART RATE: 87 BPM | DIASTOLIC BLOOD PRESSURE: 72 MMHG | SYSTOLIC BLOOD PRESSURE: 120 MMHG | WEIGHT: 156.8 LBS | BODY MASS INDEX: 21.24 KG/M2 | OXYGEN SATURATION: 98 % | HEIGHT: 72 IN | TEMPERATURE: 97.5 F

## 2023-06-13 DIAGNOSIS — S22.41XG CLOSED FRACTURE OF MULTIPLE RIBS OF RIGHT SIDE WITH DELAYED HEALING, SUBSEQUENT ENCOUNTER: Primary | ICD-10-CM
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
## 2023-06-14 ENCOUNTER — OFFICE VISIT (OUTPATIENT)
Dept: CARDIOLOGY | Facility: CLINIC | Age: 53
End: 2023-06-14
Payer: COMMERCIAL

## 2023-06-14 VITALS
OXYGEN SATURATION: 98 % | HEART RATE: 88 BPM | DIASTOLIC BLOOD PRESSURE: 84 MMHG | SYSTOLIC BLOOD PRESSURE: 138 MMHG | BODY MASS INDEX: 21.84 KG/M2 | WEIGHT: 156 LBS | HEIGHT: 71 IN

## 2023-06-14 DIAGNOSIS — E78.5 HYPERLIPIDEMIA, UNSPECIFIED HYPERLIPIDEMIA TYPE: ICD-10-CM

## 2023-06-14 DIAGNOSIS — E11.9 TYPE 2 DIABETES MELLITUS WITHOUT COMPLICATION, WITHOUT LONG-TERM CURRENT USE OF INSULIN: ICD-10-CM

## 2023-06-14 DIAGNOSIS — R00.0 TACHYCARDIA: Primary | ICD-10-CM

## 2023-06-14 DIAGNOSIS — I10 ESSENTIAL HYPERTENSION: ICD-10-CM

## 2023-06-14 DIAGNOSIS — R55 SYNCOPE AND COLLAPSE: ICD-10-CM

## 2023-06-14 DIAGNOSIS — Z72.0 TOBACCO USE: ICD-10-CM

## 2023-06-14 DIAGNOSIS — Z91.89 MULTIPLE RISK FACTORS FOR CORONARY ARTERY DISEASE: ICD-10-CM

## 2023-06-14 NOTE — LETTER
June 14, 2023     SHAUN Dumont  [address] [address]    Patient: Serge Chandra   YOB: 1970   Date of Visit: 6/14/2023       Dear SHAUN Dumont    Serge Chandra was in my office today. Below is a copy of my note.    If you have questions, please do not hesitate to call me. I look forward to following Serge along with you.         Sincerely,        SHAUN Mruillo        CC: No Recipients    Subjective    Serge Chandra is a 52 y.o. male.   Chief Complaint   Patient presents with   • Results     Labs     • Dizziness     With bending over      History of Present Illness  Serge Chandra is a 52-year-old male who presents to clinic today for cardiology follow-up.  He is accompanied by his wife.      Hx of syncopal episodes and has completed cardiac work-up. He continues to complain of intermittent dizziness but denies further episodes of syncope.  He had previously completed tilt table testing which was negative for neurocardiogenic syncope, POTS or orthostatic syncope.  There was noted heart rate changes during testing without clinically significant changes in blood pressure or reported symptoms.  He brings in a HR log today and continues to report intermittent elevated heart rates ranging mostly 100-120. He does occasional have an 80 HR or 90 but mostly after taking Metoprolol XL. He was instructed at last visit to start Toprol to regulate HR however he has just been using it prn. It does work and improve HR after taking medication. He has seen ENT and per patient report questionable Ménière's disease and he is awaiting other work-up.  He reports he was advised at ENT appointment that he had hearing loss.  He has not seen neurology but plans to do so. He denies any chest discomfort, dyspnea, palpitations, bradycardia, nausea/vomiting, dizziness, or further episodes of syncope.      Hypertension currently on Norvasc 2.5 mg daily, he only takes if BP is > 140. Home  readings are stable. He denies hypotension.  He denies chest pain or dyspnea.     Hyperlipidemia on Lipitor.  He denies medication side effects.  He continues to smoke.     Underlying DM currently treated by PCP on oral medications. He reports HgA1c is 7.7.      He reports history of anemia and is currently on iron supplements, managed by PCP.    Patient Active Problem List   Diagnosis   • Essential hypertension   • Hyperlipidemia   • Type 2 diabetes mellitus without complication, without long-term current use of insulin   • Tobacco use   • Multiple risk factors for coronary artery disease   • Syncope and collapse   • Fracture of rib of right side with delayed healing     Past Medical History:   Diagnosis Date   • Diabetes mellitus 07/2010   • GERD (gastroesophageal reflux disease)    • Hyperlipidemia 10/27/2022   • Hypertension 09/2021   • Kidney stone      Past Surgical History:   Procedure Laterality Date   • KIDNEY SURGERY         Family History   Problem Relation Age of Onset   • COPD Mother    • Lung cancer Mother    • Arthritis Mother    • Diabetes Father    • Stroke Father    • Parkinsonism Father    • Hypertension Brother    • Hypertension Brother    • Heart attack Maternal Grandfather    • Stroke Paternal Grandmother      Social History     Tobacco Use   • Smoking status: Every Day     Packs/day: 0.50     Years: 30.00     Pack years: 15.00     Types: Cigarettes   • Smokeless tobacco: Never   Vaping Use   • Vaping Use: Never used   Substance Use Topics   • Alcohol use: Not Currently   • Drug use: Not Currently         The following portions of the patient's history were reviewed and updated as appropriate: allergies, current medications, past family history, past medical history, past social history, past surgical history and problem list.    Allergies   Allergen Reactions   • Bee Venom Swelling     States yellow jackets         Current Outpatient Medications:   •  amLODIPine (NORVASC) 2.5 MG tablet, Take 1  tablet by mouth Daily. Take if BP > 140, Disp: 30 tablet, Rfl:   •  atorvastatin (LIPITOR) 20 MG tablet, Take 1 tablet by mouth Daily., Disp: 30 tablet, Rfl: 1  •  ferrous sulfate 324 (65 Fe) MG tablet delayed-release EC tablet, Take 1 tablet by mouth Daily With Breakfast., Disp: , Rfl:   •  gabapentin (NEURONTIN) 300 MG capsule, Take 1 capsule by mouth 2 (Two) Times a Day., Disp: , Rfl:   •  glyburide (DIAbeta) 5 MG tablet, Take 2 tablets by mouth 2 (Two) Times a Day., Disp: , Rfl:   •  metFORMIN (GLUCOPHAGE) 1000 MG tablet, Take 1 tablet by mouth 2 (Two) Times a Day., Disp: , Rfl:   •  metoprolol succinate XL (TOPROL-XL) 25 MG 24 hr tablet, Take 1 tablet by mouth Daily., Disp: 30 tablet, Rfl: 1  •  ofloxacin (FLOXIN) 0.3 % otic solution, As Needed., Disp: , Rfl:   •  pantoprazole (PROTONIX) 40 MG EC tablet, Take 1 tablet by mouth Daily., Disp: , Rfl:     Review of Systems   Constitutional:  Negative for activity change, appetite change, chills, diaphoresis, fatigue and fever.   HENT:  Negative for congestion, drooling, ear discharge, ear pain, mouth sores, nosebleeds, postnasal drip, rhinorrhea, sinus pressure, sneezing and sore throat.    Eyes:  Negative for pain, discharge and visual disturbance.   Respiratory:  Negative for cough, chest tightness, shortness of breath and wheezing.    Cardiovascular:  Positive for palpitations (tachycardia). Negative for chest pain and leg swelling.   Gastrointestinal:  Negative for abdominal pain, constipation, diarrhea, nausea and vomiting.   Endocrine: Negative for cold intolerance, heat intolerance, polydipsia, polyphagia and polyuria.   Musculoskeletal:  Negative for arthralgias, myalgias and neck pain.   Skin:  Negative for rash and wound.   Neurological:  Positive for dizziness. Negative for syncope, speech difficulty, weakness, light-headedness and headaches.   Hematological:  Negative for adenopathy. Does not bruise/bleed easily.   Psychiatric/Behavioral:  Negative for  "confusion, dysphoric mood and sleep disturbance. The patient is not nervous/anxious.    All other systems reviewed and are negative.    /84 (BP Location: Left arm, Patient Position: Sitting, Cuff Size: Adult)   Pulse 88   Ht 180.3 cm (71\")   Wt 70.8 kg (156 lb)   SpO2 98%   BMI 21.76 kg/m²     Objective  Allergies   Allergen Reactions   • Bee Venom Swelling     States yellow jackets       Physical Exam  Vitals reviewed.   Constitutional:       Appearance: Normal appearance. He is well-developed.   HENT:      Head: Normocephalic.   Eyes:      Conjunctiva/sclera: Conjunctivae normal.   Neck:      Thyroid: No thyromegaly.      Vascular: No carotid bruit or JVD.   Cardiovascular:      Rate and Rhythm: Normal rate and regular rhythm.   Pulmonary:      Effort: Pulmonary effort is normal.      Breath sounds: Normal breath sounds.   Musculoskeletal:      Cervical back: Neck supple.      Right lower leg: No edema.      Left lower leg: No edema.   Skin:     General: Skin is warm and dry.   Neurological:      Mental Status: He is alert and oriented to person, place, and time.   Psychiatric:         Attention and Perception: Attention normal.         Mood and Affect: Mood normal.         Speech: Speech normal.         Behavior: Behavior normal. Behavior is cooperative.         Cognition and Memory: Cognition normal.         LABS  WBC   Date Value Ref Range Status   06/08/2023 7.02 3.40 - 10.80 10*3/mm3 Final     RBC   Date Value Ref Range Status   06/08/2023 4.54 4.14 - 5.80 10*6/mm3 Final     Hemoglobin   Date Value Ref Range Status   06/08/2023 13.0 13.0 - 17.7 g/dL Final     Hematocrit   Date Value Ref Range Status   06/08/2023 39.8 37.5 - 51.0 % Final     MCV   Date Value Ref Range Status   06/08/2023 87.7 79.0 - 97.0 fL Final     MCH   Date Value Ref Range Status   06/08/2023 28.6 26.6 - 33.0 pg Final     MCHC   Date Value Ref Range Status   06/08/2023 32.7 31.5 - 35.7 g/dL Final     RDW   Date Value Ref Range " Status   06/08/2023 15.5 (H) 12.3 - 15.4 % Final     RDW-SD   Date Value Ref Range Status   06/08/2023 50.0 37.0 - 54.0 fl Final     MPV   Date Value Ref Range Status   06/08/2023 12.6 (H) 6.0 - 12.0 fL Final     Platelets   Date Value Ref Range Status   06/08/2023 222 140 - 450 10*3/mm3 Final     Neutrophil %   Date Value Ref Range Status   06/08/2023 64.9 42.7 - 76.0 % Final     Lymphocyte %   Date Value Ref Range Status   06/08/2023 25.8 19.6 - 45.3 % Final     Monocyte %   Date Value Ref Range Status   06/08/2023 5.1 5.0 - 12.0 % Final     Eosinophil %   Date Value Ref Range Status   06/08/2023 3.0 0.3 - 6.2 % Final     Basophil %   Date Value Ref Range Status   06/08/2023 0.9 0.0 - 1.5 % Final     Immature Grans %   Date Value Ref Range Status   06/08/2023 0.3 0.0 - 0.5 % Final     Neutrophils, Absolute   Date Value Ref Range Status   06/08/2023 4.56 1.70 - 7.00 10*3/mm3 Final     Lymphocytes, Absolute   Date Value Ref Range Status   06/08/2023 1.81 0.70 - 3.10 10*3/mm3 Final     Monocytes, Absolute   Date Value Ref Range Status   06/08/2023 0.36 0.10 - 0.90 10*3/mm3 Final     Eosinophils, Absolute   Date Value Ref Range Status   06/08/2023 0.21 0.00 - 0.40 10*3/mm3 Final     Basophils, Absolute   Date Value Ref Range Status   06/08/2023 0.06 0.00 - 0.20 10*3/mm3 Final     Immature Grans, Absolute   Date Value Ref Range Status   06/08/2023 0.02 0.00 - 0.05 10*3/mm3 Final     nRBC   Date Value Ref Range Status   06/08/2023 0.0 0.0 - 0.2 /100 WBC Final       No results found for: CHOL, TRIG, HDL, LDL    IMAGING   CT Abdomen Pelvis With & Without Contrast    Result Date: 6/7/2023   1. Prominence of the pancreatic head. Recommend GI follow-up and consider ERCP.  2.Moderate to large stool burden       This report was finalized on 6/7/2023 8:49 AM by Dr. Carlo Washington MD.      CT Chest With & Without Contrast Diagnostic    Result Date: 6/7/2023   1. Subacute appearing right sixth through 10th rib fractures. 2. No  parenchymal nodules, pneumothorax, effusions, or adenopathy  This report was finalized on 6/7/2023 8:54 AM by Dr. Carlo Washington MD.      US Carotid Bilateral    Result Date: 4/24/2023  Impression:  No evidence of hemodynamically significant plaques or stenosis within the carotid system at this time.  This report was finalized on 4/24/2023 8:38 AM by Dr. Carlo Washington MD.              Assessment & Plan  Diagnoses and all orders for this visit:    1. Tachycardia  Home log with elevated heart rate, recommend Toprol daily, recommend avoidance of caffeine/stimulants, continue to monitor heart rate    2. Essential hypertension (Primary)  Controlled, continue Norvasc as needed for systolic greater than 140, sodium restrictions    3. Hyperlipidemia, unspecified hyperlipidemia type  Continue statin, heart healthy diet    4. Type 2 diabetes mellitus without complication, without long-term current use of insulin  Recommend tight glycemic control for overall health benefit    5. Syncope and collapse  Has completed cardiac work-up, no further episodes of syncope, will continue to monitor    6. Multiple risk factors for coronary artery disease  Ischemic evaluation has been performed, will continue to monitor    7. Tobacco use  Recommend avoidance of tobacco use    Lifestyle modifications including heart healthy diet, regular exercise, maintenance of desirable body weight and avoidance of tobacco products.      Follow-up in 6 weeks, sooner if needed

## 2023-06-14 NOTE — PROGRESS NOTES
Subjective     Serge Chandra is a 52 y.o. male.   Chief Complaint   Patient presents with    Results     Labs      Dizziness     With bending over      History of Present Illness  Serge Chandar is a 52-year-old male who presents to clinic today for cardiology follow-up.  He is accompanied by his wife.      Hx of syncopal episodes and has completed cardiac work-up. He continues to complain of intermittent dizziness but denies further episodes of syncope.  He had previously completed tilt table testing which was negative for neurocardiogenic syncope, POTS or orthostatic syncope.  There was noted heart rate changes during testing without clinically significant changes in blood pressure or reported symptoms.  He brings in a HR log today and continues to report intermittent elevated heart rates ranging mostly 100-120. He does occasional have an 80 HR or 90 but mostly after taking Metoprolol XL. He was instructed at last visit to start Toprol to regulate HR however he has just been using it prn. It does work and improve HR after taking medication. He has seen ENT and per patient report questionable Ménière's disease and he is awaiting other work-up.  He reports he was advised at ENT appointment that he had hearing loss.  He has not seen neurology but plans to do so. He denies any chest discomfort, dyspnea, palpitations, bradycardia, nausea/vomiting, dizziness, or further episodes of syncope.      Hypertension currently on Norvasc 2.5 mg daily, he only takes if BP is > 140. Home readings are stable. He denies hypotension.  He denies chest pain or dyspnea.     Hyperlipidemia on Lipitor.  He denies medication side effects.  He continues to smoke.     Underlying DM currently treated by PCP on oral medications. He reports HgA1c is 7.7.      He reports history of anemia and is currently on iron supplements, managed by PCP.    Patient Active Problem List   Diagnosis    Essential hypertension    Hyperlipidemia    Type 2  diabetes mellitus without complication, without long-term current use of insulin    Tobacco use    Multiple risk factors for coronary artery disease    Syncope and collapse    Fracture of rib of right side with delayed healing     Past Medical History:   Diagnosis Date    Diabetes mellitus 07/2010    GERD (gastroesophageal reflux disease)     Hyperlipidemia 10/27/2022    Hypertension 09/2021    Kidney stone      Past Surgical History:   Procedure Laterality Date    KIDNEY SURGERY         Family History   Problem Relation Age of Onset    COPD Mother     Lung cancer Mother     Arthritis Mother     Diabetes Father     Stroke Father     Parkinsonism Father     Hypertension Brother     Hypertension Brother     Heart attack Maternal Grandfather     Stroke Paternal Grandmother      Social History     Tobacco Use    Smoking status: Every Day     Packs/day: 0.50     Years: 30.00     Pack years: 15.00     Types: Cigarettes    Smokeless tobacco: Never   Vaping Use    Vaping Use: Never used   Substance Use Topics    Alcohol use: Not Currently    Drug use: Not Currently         The following portions of the patient's history were reviewed and updated as appropriate: allergies, current medications, past family history, past medical history, past social history, past surgical history and problem list.    Allergies   Allergen Reactions    Bee Venom Swelling     States yellow jackets         Current Outpatient Medications:     amLODIPine (NORVASC) 2.5 MG tablet, Take 1 tablet by mouth Daily. Take if BP > 140, Disp: 30 tablet, Rfl:     atorvastatin (LIPITOR) 20 MG tablet, Take 1 tablet by mouth Daily., Disp: 30 tablet, Rfl: 1    ferrous sulfate 324 (65 Fe) MG tablet delayed-release EC tablet, Take 1 tablet by mouth Daily With Breakfast., Disp: , Rfl:     gabapentin (NEURONTIN) 300 MG capsule, Take 1 capsule by mouth 2 (Two) Times a Day., Disp: , Rfl:     glyburide (DIAbeta) 5 MG tablet, Take 2 tablets by mouth 2 (Two) Times a Day.,  "Disp: , Rfl:     metFORMIN (GLUCOPHAGE) 1000 MG tablet, Take 1 tablet by mouth 2 (Two) Times a Day., Disp: , Rfl:     metoprolol succinate XL (TOPROL-XL) 25 MG 24 hr tablet, Take 1 tablet by mouth Daily., Disp: 30 tablet, Rfl: 1    ofloxacin (FLOXIN) 0.3 % otic solution, As Needed., Disp: , Rfl:     pantoprazole (PROTONIX) 40 MG EC tablet, Take 1 tablet by mouth Daily., Disp: , Rfl:     Review of Systems   Constitutional:  Negative for activity change, appetite change, chills, diaphoresis, fatigue and fever.   HENT:  Negative for congestion, drooling, ear discharge, ear pain, mouth sores, nosebleeds, postnasal drip, rhinorrhea, sinus pressure, sneezing and sore throat.    Eyes:  Negative for pain, discharge and visual disturbance.   Respiratory:  Negative for cough, chest tightness, shortness of breath and wheezing.    Cardiovascular:  Positive for palpitations (tachycardia). Negative for chest pain and leg swelling.   Gastrointestinal:  Negative for abdominal pain, constipation, diarrhea, nausea and vomiting.   Endocrine: Negative for cold intolerance, heat intolerance, polydipsia, polyphagia and polyuria.   Musculoskeletal:  Negative for arthralgias, myalgias and neck pain.   Skin:  Negative for rash and wound.   Neurological:  Positive for dizziness. Negative for syncope, speech difficulty, weakness, light-headedness and headaches.   Hematological:  Negative for adenopathy. Does not bruise/bleed easily.   Psychiatric/Behavioral:  Negative for confusion, dysphoric mood and sleep disturbance. The patient is not nervous/anxious.    All other systems reviewed and are negative.    /84 (BP Location: Left arm, Patient Position: Sitting, Cuff Size: Adult)   Pulse 88   Ht 180.3 cm (71\")   Wt 70.8 kg (156 lb)   SpO2 98%   BMI 21.76 kg/m²     Objective   Allergies   Allergen Reactions    Bee Venom Swelling     States yellow jackets       Physical Exam  Vitals reviewed.   Constitutional:       Appearance: Normal " appearance. He is well-developed.   HENT:      Head: Normocephalic.   Eyes:      Conjunctiva/sclera: Conjunctivae normal.   Neck:      Thyroid: No thyromegaly.      Vascular: No carotid bruit or JVD.   Cardiovascular:      Rate and Rhythm: Normal rate and regular rhythm.   Pulmonary:      Effort: Pulmonary effort is normal.      Breath sounds: Normal breath sounds.   Musculoskeletal:      Cervical back: Neck supple.      Right lower leg: No edema.      Left lower leg: No edema.   Skin:     General: Skin is warm and dry.   Neurological:      Mental Status: He is alert and oriented to person, place, and time.   Psychiatric:         Attention and Perception: Attention normal.         Mood and Affect: Mood normal.         Speech: Speech normal.         Behavior: Behavior normal. Behavior is cooperative.         Cognition and Memory: Cognition normal.         LABS  WBC   Date Value Ref Range Status   06/08/2023 7.02 3.40 - 10.80 10*3/mm3 Final     RBC   Date Value Ref Range Status   06/08/2023 4.54 4.14 - 5.80 10*6/mm3 Final     Hemoglobin   Date Value Ref Range Status   06/08/2023 13.0 13.0 - 17.7 g/dL Final     Hematocrit   Date Value Ref Range Status   06/08/2023 39.8 37.5 - 51.0 % Final     MCV   Date Value Ref Range Status   06/08/2023 87.7 79.0 - 97.0 fL Final     MCH   Date Value Ref Range Status   06/08/2023 28.6 26.6 - 33.0 pg Final     MCHC   Date Value Ref Range Status   06/08/2023 32.7 31.5 - 35.7 g/dL Final     RDW   Date Value Ref Range Status   06/08/2023 15.5 (H) 12.3 - 15.4 % Final     RDW-SD   Date Value Ref Range Status   06/08/2023 50.0 37.0 - 54.0 fl Final     MPV   Date Value Ref Range Status   06/08/2023 12.6 (H) 6.0 - 12.0 fL Final     Platelets   Date Value Ref Range Status   06/08/2023 222 140 - 450 10*3/mm3 Final     Neutrophil %   Date Value Ref Range Status   06/08/2023 64.9 42.7 - 76.0 % Final     Lymphocyte %   Date Value Ref Range Status   06/08/2023 25.8 19.6 - 45.3 % Final     Monocyte %    Date Value Ref Range Status   06/08/2023 5.1 5.0 - 12.0 % Final     Eosinophil %   Date Value Ref Range Status   06/08/2023 3.0 0.3 - 6.2 % Final     Basophil %   Date Value Ref Range Status   06/08/2023 0.9 0.0 - 1.5 % Final     Immature Grans %   Date Value Ref Range Status   06/08/2023 0.3 0.0 - 0.5 % Final     Neutrophils, Absolute   Date Value Ref Range Status   06/08/2023 4.56 1.70 - 7.00 10*3/mm3 Final     Lymphocytes, Absolute   Date Value Ref Range Status   06/08/2023 1.81 0.70 - 3.10 10*3/mm3 Final     Monocytes, Absolute   Date Value Ref Range Status   06/08/2023 0.36 0.10 - 0.90 10*3/mm3 Final     Eosinophils, Absolute   Date Value Ref Range Status   06/08/2023 0.21 0.00 - 0.40 10*3/mm3 Final     Basophils, Absolute   Date Value Ref Range Status   06/08/2023 0.06 0.00 - 0.20 10*3/mm3 Final     Immature Grans, Absolute   Date Value Ref Range Status   06/08/2023 0.02 0.00 - 0.05 10*3/mm3 Final     nRBC   Date Value Ref Range Status   06/08/2023 0.0 0.0 - 0.2 /100 WBC Final       No results found for: CHOL, TRIG, HDL, LDL    IMAGING   CT Abdomen Pelvis With & Without Contrast    Result Date: 6/7/2023   1. Prominence of the pancreatic head. Recommend GI follow-up and consider ERCP.  2.Moderate to large stool burden       This report was finalized on 6/7/2023 8:49 AM by Dr. Carlo Washington MD.      CT Chest With & Without Contrast Diagnostic    Result Date: 6/7/2023   1. Subacute appearing right sixth through 10th rib fractures. 2. No parenchymal nodules, pneumothorax, effusions, or adenopathy  This report was finalized on 6/7/2023 8:54 AM by Dr. Carlo Washington MD.      US Carotid Bilateral    Result Date: 4/24/2023  Impression:  No evidence of hemodynamically significant plaques or stenosis within the carotid system at this time.  This report was finalized on 4/24/2023 8:38 AM by Dr. Carlo Washington MD.              Assessment & Plan   Diagnoses and all orders for this visit:    1. Tachycardia  Home log with  elevated heart rate, recommend Toprol daily, recommend avoidance of caffeine/stimulants, continue to monitor heart rate    2. Essential hypertension (Primary)  Controlled, continue Norvasc as needed for systolic greater than 140, sodium restrictions    3. Hyperlipidemia, unspecified hyperlipidemia type  Continue statin, heart healthy diet    4. Type 2 diabetes mellitus without complication, without long-term current use of insulin  Recommend tight glycemic control for overall health benefit    5. Syncope and collapse  Has completed cardiac work-up, no further episodes of syncope, will continue to monitor    6. Multiple risk factors for coronary artery disease  Ischemic evaluation has been performed, will continue to monitor    7. Tobacco use  Recommend avoidance of tobacco use    Lifestyle modifications including heart healthy diet, regular exercise, maintenance of desirable body weight and avoidance of tobacco products.      Follow-up in 6 weeks, sooner if needed

## 2023-06-15 ENCOUNTER — TRANSCRIBE ORDERS (OUTPATIENT)
Dept: ADMINISTRATIVE | Facility: HOSPITAL | Age: 53
End: 2023-06-15
Payer: COMMERCIAL

## 2023-06-15 ENCOUNTER — HOSPITAL ENCOUNTER (EMERGENCY)
Facility: HOSPITAL | Age: 53
Discharge: HOME | End: 2023-06-16
Attending: EMERGENCY MEDICINE
Payer: COMMERCIAL

## 2023-06-15 ENCOUNTER — APPOINTMENT (OUTPATIENT)
Dept: CT IMAGING | Facility: HOSPITAL | Age: 53
End: 2023-06-15
Payer: COMMERCIAL

## 2023-06-15 DIAGNOSIS — E86.0 DEHYDRATION: Primary | ICD-10-CM

## 2023-06-15 DIAGNOSIS — K59.00 CONSTIPATION, UNSPECIFIED CONSTIPATION TYPE: ICD-10-CM

## 2023-06-15 DIAGNOSIS — K63.89 COLONIC MASS: ICD-10-CM

## 2023-06-15 DIAGNOSIS — R93.5 ABNORMAL ABDOMINAL ULTRASOUND: Primary | ICD-10-CM

## 2023-06-15 LAB
ALBUMIN SERPL-MCNC: 4.4 G/DL (ref 3.5–5.2)
ALBUMIN/GLOB SERPL: 1.5 G/DL
ALP SERPL-CCNC: 80 U/L (ref 39–117)
ALT SERPL W P-5'-P-CCNC: 10 U/L (ref 1–41)
ANION GAP SERPL CALCULATED.3IONS-SCNC: 10.4 MMOL/L (ref 5–15)
AST SERPL-CCNC: 9 U/L (ref 1–40)
BASOPHILS # BLD AUTO: 0.07 10*3/MM3 (ref 0–0.2)
BASOPHILS NFR BLD AUTO: 0.6 % (ref 0–1.5)
BILIRUB SERPL-MCNC: 0.2 MG/DL (ref 0–1.2)
BILIRUB UR QL STRIP: NEGATIVE
BUN SERPL-MCNC: 40 MG/DL (ref 6–20)
BUN/CREAT SERPL: 20.5 (ref 7–25)
CALCIUM SPEC-SCNC: 11.8 MG/DL (ref 8.6–10.5)
CHLORIDE SERPL-SCNC: 98 MMOL/L (ref 98–107)
CLARITY UR: CLEAR
CO2 SERPL-SCNC: 28.6 MMOL/L (ref 22–29)
COLOR UR: YELLOW
CREAT SERPL-MCNC: 1.95 MG/DL (ref 0.76–1.27)
D-LACTATE SERPL-SCNC: 1.3 MMOL/L (ref 0.5–2)
DEPRECATED RDW RBC AUTO: 51.8 FL (ref 37–54)
EGFRCR SERPLBLD CKD-EPI 2021: 40.4 ML/MIN/1.73
EOSINOPHIL # BLD AUTO: 0.2 10*3/MM3 (ref 0–0.4)
EOSINOPHIL NFR BLD AUTO: 1.8 % (ref 0.3–6.2)
ERYTHROCYTE [DISTWIDTH] IN BLOOD BY AUTOMATED COUNT: 15.7 % (ref 12.3–15.4)
GLOBULIN UR ELPH-MCNC: 2.9 GM/DL
GLUCOSE SERPL-MCNC: 242 MG/DL (ref 65–99)
GLUCOSE UR STRIP-MCNC: ABNORMAL MG/DL
HCT VFR BLD AUTO: 40 % (ref 37.5–51)
HGB BLD-MCNC: 13.1 G/DL (ref 13–17.7)
HGB UR QL STRIP.AUTO: NEGATIVE
IMM GRANULOCYTES # BLD AUTO: 0.03 10*3/MM3 (ref 0–0.05)
IMM GRANULOCYTES NFR BLD AUTO: 0.3 % (ref 0–0.5)
KETONES UR QL STRIP: NEGATIVE
LEUKOCYTE ESTERASE UR QL STRIP.AUTO: NEGATIVE
LIPASE SERPL-CCNC: 100 U/L (ref 13–60)
LYMPHOCYTES # BLD AUTO: 2.38 10*3/MM3 (ref 0.7–3.1)
LYMPHOCYTES NFR BLD AUTO: 21.2 % (ref 19.6–45.3)
MCH RBC QN AUTO: 29.2 PG (ref 26.6–33)
MCHC RBC AUTO-ENTMCNC: 32.8 G/DL (ref 31.5–35.7)
MCV RBC AUTO: 89.3 FL (ref 79–97)
MONOCYTES # BLD AUTO: 0.82 10*3/MM3 (ref 0.1–0.9)
MONOCYTES NFR BLD AUTO: 7.3 % (ref 5–12)
NEUTROPHILS NFR BLD AUTO: 68.8 % (ref 42.7–76)
NEUTROPHILS NFR BLD AUTO: 7.71 10*3/MM3 (ref 1.7–7)
NITRITE UR QL STRIP: NEGATIVE
NRBC BLD AUTO-RTO: 0 /100 WBC (ref 0–0.2)
PH UR STRIP.AUTO: 6 [PH] (ref 5–8)
PLATELET # BLD AUTO: 242 10*3/MM3 (ref 140–450)
PMV BLD AUTO: 11.2 FL (ref 6–12)
POTASSIUM SERPL-SCNC: 4.3 MMOL/L (ref 3.5–5.2)
PROT SERPL-MCNC: 7.3 G/DL (ref 6–8.5)
PROT UR QL STRIP: NEGATIVE
RBC # BLD AUTO: 4.48 10*6/MM3 (ref 4.14–5.8)
SODIUM SERPL-SCNC: 137 MMOL/L (ref 136–145)
SP GR UR STRIP: 1.01 (ref 1–1.03)
UROBILINOGEN UR QL STRIP: ABNORMAL
WBC NRBC COR # BLD: 11.21 10*3/MM3 (ref 3.4–10.8)

## 2023-06-15 PROCEDURE — 83605 ASSAY OF LACTIC ACID: CPT | Performed by: STUDENT IN AN ORGANIZED HEALTH CARE EDUCATION/TRAINING PROGRAM

## 2023-06-15 PROCEDURE — 85025 COMPLETE CBC W/AUTO DIFF WBC: CPT | Performed by: STUDENT IN AN ORGANIZED HEALTH CARE EDUCATION/TRAINING PROGRAM

## 2023-06-15 PROCEDURE — 83690 ASSAY OF LIPASE: CPT | Performed by: STUDENT IN AN ORGANIZED HEALTH CARE EDUCATION/TRAINING PROGRAM

## 2023-06-15 PROCEDURE — 81003 URINALYSIS AUTO W/O SCOPE: CPT | Performed by: STUDENT IN AN ORGANIZED HEALTH CARE EDUCATION/TRAINING PROGRAM

## 2023-06-15 PROCEDURE — 82150 ASSAY OF AMYLASE: CPT | Performed by: NURSE PRACTITIONER

## 2023-06-15 PROCEDURE — 74176 CT ABD & PELVIS W/O CONTRAST: CPT

## 2023-06-15 PROCEDURE — 80053 COMPREHEN METABOLIC PANEL: CPT | Performed by: STUDENT IN AN ORGANIZED HEALTH CARE EDUCATION/TRAINING PROGRAM

## 2023-06-15 RX ORDER — SODIUM CHLORIDE 0.9 % (FLUSH) 0.9 %
10 SYRINGE (ML) INJECTION AS NEEDED
Status: DISCONTINUED | OUTPATIENT
Start: 2023-06-15 | End: 2023-06-16 | Stop reason: HOSPADM

## 2023-06-16 VITALS
BODY MASS INDEX: 21.13 KG/M2 | TEMPERATURE: 98.1 F | HEIGHT: 72 IN | WEIGHT: 156 LBS | HEART RATE: 81 BPM | RESPIRATION RATE: 18 BRPM | DIASTOLIC BLOOD PRESSURE: 91 MMHG | SYSTOLIC BLOOD PRESSURE: 137 MMHG | OXYGEN SATURATION: 97 %

## 2023-06-16 LAB
ALBUMIN SERPL-MCNC: 3.9 G/DL (ref 3.5–5.2)
ALBUMIN SERPL-MCNC: 4 G/DL (ref 3.5–5.2)
ALBUMIN/GLOB SERPL: 1.4 G/DL
ALBUMIN/GLOB SERPL: 1.5 G/DL
ALP SERPL-CCNC: 74 U/L (ref 39–117)
ALP SERPL-CCNC: 78 U/L (ref 39–117)
ALT SERPL W P-5'-P-CCNC: 8 U/L (ref 1–41)
ALT SERPL W P-5'-P-CCNC: 9 U/L (ref 1–41)
AMYLASE SERPL-CCNC: 75 U/L (ref 28–100)
ANION GAP SERPL CALCULATED.3IONS-SCNC: 11.6 MMOL/L (ref 5–15)
ANION GAP SERPL CALCULATED.3IONS-SCNC: 9 MMOL/L (ref 5–15)
AST SERPL-CCNC: 10 U/L (ref 1–40)
AST SERPL-CCNC: 10 U/L (ref 1–40)
BILIRUB SERPL-MCNC: 0.2 MG/DL (ref 0–1.2)
BILIRUB SERPL-MCNC: 0.2 MG/DL (ref 0–1.2)
BUN SERPL-MCNC: 34 MG/DL (ref 6–20)
BUN SERPL-MCNC: 39 MG/DL (ref 6–20)
BUN/CREAT SERPL: 18.3 (ref 7–25)
BUN/CREAT SERPL: 21.5 (ref 7–25)
CALCIUM SPEC-SCNC: 10.5 MG/DL (ref 8.6–10.5)
CALCIUM SPEC-SCNC: 10.7 MG/DL (ref 8.6–10.5)
CHLORIDE SERPL-SCNC: 102 MMOL/L (ref 98–107)
CHLORIDE SERPL-SCNC: 104 MMOL/L (ref 98–107)
CO2 SERPL-SCNC: 24.4 MMOL/L (ref 22–29)
CO2 SERPL-SCNC: 26 MMOL/L (ref 22–29)
CREAT SERPL-MCNC: 1.81 MG/DL (ref 0.76–1.27)
CREAT SERPL-MCNC: 1.86 MG/DL (ref 0.76–1.27)
EGFRCR SERPLBLD CKD-EPI 2021: 42.7 ML/MIN/1.73
EGFRCR SERPLBLD CKD-EPI 2021: 44.2 ML/MIN/1.73
GLOBULIN UR ELPH-MCNC: 2.6 GM/DL
GLOBULIN UR ELPH-MCNC: 2.8 GM/DL
GLUCOSE SERPL-MCNC: 226 MG/DL (ref 65–99)
GLUCOSE SERPL-MCNC: 235 MG/DL (ref 65–99)
HOLD SPECIMEN: NORMAL
HOLD SPECIMEN: NORMAL
POTASSIUM SERPL-SCNC: 4.3 MMOL/L (ref 3.5–5.2)
POTASSIUM SERPL-SCNC: 4.4 MMOL/L (ref 3.5–5.2)
PROT SERPL-MCNC: 6.5 G/DL (ref 6–8.5)
PROT SERPL-MCNC: 6.8 G/DL (ref 6–8.5)
SODIUM SERPL-SCNC: 138 MMOL/L (ref 136–145)
SODIUM SERPL-SCNC: 139 MMOL/L (ref 136–145)
WHOLE BLOOD HOLD COAG: NORMAL
WHOLE BLOOD HOLD SPECIMEN: NORMAL

## 2023-06-16 PROCEDURE — 80053 COMPREHEN METABOLIC PANEL: CPT | Performed by: NURSE PRACTITIONER

## 2023-06-16 PROCEDURE — 25010000002 MORPHINE PER 10 MG: Performed by: NURSE PRACTITIONER

## 2023-06-16 PROCEDURE — 25010000002 ONDANSETRON PER 1 MG: Performed by: NURSE PRACTITIONER

## 2023-06-16 RX ORDER — ONDANSETRON 2 MG/ML
4 INJECTION INTRAMUSCULAR; INTRAVENOUS ONCE
Status: COMPLETED | OUTPATIENT
Start: 2023-06-16 | End: 2023-06-16

## 2023-06-16 RX ORDER — OXYCODONE AND ACETAMINOPHEN 10; 325 MG/1; MG/1
1 TABLET ORAL ONCE
Status: COMPLETED | OUTPATIENT
Start: 2023-06-16 | End: 2023-06-16

## 2023-06-16 RX ADMIN — OXYCODONE AND ACETAMINOPHEN 1 TABLET: 10; 325 TABLET ORAL at 08:01

## 2023-06-16 RX ADMIN — MORPHINE SULFATE 4 MG: 4 INJECTION, SOLUTION INTRAMUSCULAR; INTRAVENOUS at 03:40

## 2023-06-16 RX ADMIN — ONDANSETRON 4 MG: 2 INJECTION INTRAMUSCULAR; INTRAVENOUS at 06:34

## 2023-06-16 RX ADMIN — SODIUM CHLORIDE 1000 ML: 9 INJECTION, SOLUTION INTRAVENOUS at 03:40

## 2023-06-16 RX ADMIN — SODIUM CHLORIDE 1000 ML: 9 INJECTION, SOLUTION INTRAVENOUS at 00:26

## 2023-06-16 RX ADMIN — ONDANSETRON 4 MG: 2 INJECTION INTRAMUSCULAR; INTRAVENOUS at 03:40

## 2023-06-16 RX ADMIN — MORPHINE SULFATE 4 MG: 4 INJECTION, SOLUTION INTRAMUSCULAR; INTRAVENOUS at 06:34

## 2023-06-16 NOTE — ED NOTES
Provider at bedside at this time updating pt on results and plan to discharge home, all questions and concerns addressed, understanding verbalized.

## 2023-06-16 NOTE — ED NOTES
MEDICAL SCREENING:    Reason for Visit: Abdominal pain    Patient initially seen in triage.  The patient was advised further evaluation and diagnostic testing will be needed, some of the treatment and testing will be initiated in the lobby in order to begin the process.  The patient will be returned to the waiting area for the time being and possibly be re-assessed by a subsequent ED provider.  The patient will be brought back to the treatment area in as timely manner as possible.       All Rocha, DO  06/15/23 2220

## 2023-06-18 NOTE — ED PROVIDER NOTES
Subjective   History of Present Illness  Patient is a 52-year-old male with significant past medical history positive for type 2 diabetes non-insulin-dependent, GERD, hyperlipidemia, hypertension, kidney stones presenting to the ER complaints abnormal labs.  Patient reports that his PCP called him about 830 and told him to come to the ER for an elevated creatinine.  Patient has no history of kidney disease.  Patient reports that he has been seeing GI and having colonoscopies for constipation and he thinks he may be dehydrated is why his kidney function is elevated.  Patient reports that he has had a lot of bowel preps and difficulty getting his constipation controlled.  Patient sees Dr. Rivera for GI.  Patient reports that they recently did see a lesion in his colon.  Patient reports that he broke his right rib approximately 2 months ago and is still experiencing excruciating pain from that.  Patient denies fever, cough, nausea, vomiting, shortness of air or any additional symptoms today.  Patient denies any alleviating or worsening factors.    History provided by:  Patient   used: No      Review of Systems   Constitutional: Negative.  Negative for fever.   HENT: Negative.     Respiratory: Negative.     Cardiovascular: Negative.  Negative for chest pain.        Right rib pain   Gastrointestinal: Negative.  Negative for abdominal pain.   Endocrine: Negative.    Genitourinary: Negative.  Negative for dysuria.   Skin: Negative.    Neurological: Negative.    Hematological:         Abnormal labs sent by PCP   Psychiatric/Behavioral: Negative.     All other systems reviewed and are negative.    Past Medical History:   Diagnosis Date    Diabetes mellitus 07/2010    GERD (gastroesophageal reflux disease)     Hyperlipidemia 10/27/2022    Hypertension 09/2021    Kidney stone        Allergies   Allergen Reactions    Bee Venom Swelling     States yellow jackets       Past Surgical History:   Procedure  Laterality Date    KIDNEY SURGERY         Family History   Problem Relation Age of Onset    COPD Mother     Lung cancer Mother     Arthritis Mother     Diabetes Father     Stroke Father     Parkinsonism Father     Hypertension Brother     Hypertension Brother     Heart attack Maternal Grandfather     Stroke Paternal Grandmother        Social History     Socioeconomic History    Marital status:     Number of children: 1   Tobacco Use    Smoking status: Every Day     Packs/day: 0.50     Years: 30.00     Pack years: 15.00     Types: Cigarettes    Smokeless tobacco: Never   Vaping Use    Vaping Use: Never used   Substance and Sexual Activity    Alcohol use: Not Currently    Drug use: Not Currently    Sexual activity: Yes     Partners: Female     Birth control/protection: Other           Objective   Physical Exam  Vitals and nursing note reviewed.   Constitutional:       General: He is not in acute distress.     Appearance: He is well-developed. He is not diaphoretic.   HENT:      Head: Normocephalic and atraumatic.      Right Ear: External ear normal.      Left Ear: External ear normal.      Nose: Nose normal.   Eyes:      Conjunctiva/sclera: Conjunctivae normal.      Pupils: Pupils are equal, round, and reactive to light.   Neck:      Vascular: No JVD.      Trachea: No tracheal deviation.   Cardiovascular:      Rate and Rhythm: Normal rate and regular rhythm.      Heart sounds: Normal heart sounds. No murmur heard.  Pulmonary:      Effort: Pulmonary effort is normal. No respiratory distress.      Breath sounds: Normal breath sounds. No wheezing.   Abdominal:      General: Bowel sounds are decreased. There is distension.      Palpations: Abdomen is soft.      Tenderness: There is generalized no abdominal tenderness.   Musculoskeletal:         General: No deformity. Normal range of motion.      Cervical back: Normal range of motion and neck supple.   Skin:     General: Skin is warm and dry.      Coloration: Skin  is not pale.      Findings: No erythema or rash.   Neurological:      Mental Status: He is alert and oriented to person, place, and time.      Cranial Nerves: No cranial nerve deficit.   Psychiatric:         Behavior: Behavior normal.         Thought Content: Thought content normal.       Procedures         Results for orders placed or performed during the hospital encounter of 06/15/23   Comprehensive Metabolic Panel    Specimen: Blood   Result Value Ref Range    Glucose 242 (H) 65 - 99 mg/dL    BUN 40 (H) 6 - 20 mg/dL    Creatinine 1.95 (H) 0.76 - 1.27 mg/dL    Sodium 137 136 - 145 mmol/L    Potassium 4.3 3.5 - 5.2 mmol/L    Chloride 98 98 - 107 mmol/L    CO2 28.6 22.0 - 29.0 mmol/L    Calcium 11.8 (H) 8.6 - 10.5 mg/dL    Total Protein 7.3 6.0 - 8.5 g/dL    Albumin 4.4 3.5 - 5.2 g/dL    ALT (SGPT) 10 1 - 41 U/L    AST (SGOT) 9 1 - 40 U/L    Alkaline Phosphatase 80 39 - 117 U/L    Total Bilirubin 0.2 0.0 - 1.2 mg/dL    Globulin 2.9 gm/dL    A/G Ratio 1.5 g/dL    BUN/Creatinine Ratio 20.5 7.0 - 25.0    Anion Gap 10.4 5.0 - 15.0 mmol/L    eGFR 40.4 (L) >60.0 mL/min/1.73   Lipase    Specimen: Blood   Result Value Ref Range    Lipase 100 (H) 13 - 60 U/L   Urinalysis With Microscopic If Indicated (No Culture) - Urine, Clean Catch    Specimen: Urine, Clean Catch   Result Value Ref Range    Color, UA Yellow Yellow, Straw    Appearance, UA Clear Clear    pH, UA 6.0 5.0 - 8.0    Specific Gravity, UA 1.013 1.005 - 1.030    Glucose,  mg/dL (2+) (A) Negative    Ketones, UA Negative Negative    Bilirubin, UA Negative Negative    Blood, UA Negative Negative    Protein, UA Negative Negative    Leuk Esterase, UA Negative Negative    Nitrite, UA Negative Negative    Urobilinogen, UA 0.2 E.U./dL 0.2 - 1.0 E.U./dL   Lactic Acid, Plasma    Specimen: Blood   Result Value Ref Range    Lactate 1.3 0.5 - 2.0 mmol/L   CBC Auto Differential    Specimen: Blood   Result Value Ref Range    WBC 11.21 (H) 3.40 - 10.80 10*3/mm3    RBC 4.48  4.14 - 5.80 10*6/mm3    Hemoglobin 13.1 13.0 - 17.7 g/dL    Hematocrit 40.0 37.5 - 51.0 %    MCV 89.3 79.0 - 97.0 fL    MCH 29.2 26.6 - 33.0 pg    MCHC 32.8 31.5 - 35.7 g/dL    RDW 15.7 (H) 12.3 - 15.4 %    RDW-SD 51.8 37.0 - 54.0 fl    MPV 11.2 6.0 - 12.0 fL    Platelets 242 140 - 450 10*3/mm3    Neutrophil % 68.8 42.7 - 76.0 %    Lymphocyte % 21.2 19.6 - 45.3 %    Monocyte % 7.3 5.0 - 12.0 %    Eosinophil % 1.8 0.3 - 6.2 %    Basophil % 0.6 0.0 - 1.5 %    Immature Grans % 0.3 0.0 - 0.5 %    Neutrophils, Absolute 7.71 (H) 1.70 - 7.00 10*3/mm3    Lymphocytes, Absolute 2.38 0.70 - 3.10 10*3/mm3    Monocytes, Absolute 0.82 0.10 - 0.90 10*3/mm3    Eosinophils, Absolute 0.20 0.00 - 0.40 10*3/mm3    Basophils, Absolute 0.07 0.00 - 0.20 10*3/mm3    Immature Grans, Absolute 0.03 0.00 - 0.05 10*3/mm3    nRBC 0.0 0.0 - 0.2 /100 WBC   Amylase    Specimen: Blood   Result Value Ref Range    Amylase 75 28 - 100 U/L   Comprehensive Metabolic Panel    Specimen: Arm, Right; Blood   Result Value Ref Range    Glucose 226 (H) 65 - 99 mg/dL    BUN 39 (H) 6 - 20 mg/dL    Creatinine 1.81 (H) 0.76 - 1.27 mg/dL    Sodium 138 136 - 145 mmol/L    Potassium 4.3 3.5 - 5.2 mmol/L    Chloride 102 98 - 107 mmol/L    CO2 24.4 22.0 - 29.0 mmol/L    Calcium 10.7 (H) 8.6 - 10.5 mg/dL    Total Protein 6.5 6.0 - 8.5 g/dL    Albumin 3.9 3.5 - 5.2 g/dL    ALT (SGPT) 8 1 - 41 U/L    AST (SGOT) 10 1 - 40 U/L    Alkaline Phosphatase 74 39 - 117 U/L    Total Bilirubin 0.2 0.0 - 1.2 mg/dL    Globulin 2.6 gm/dL    A/G Ratio 1.5 g/dL    BUN/Creatinine Ratio 21.5 7.0 - 25.0    Anion Gap 11.6 5.0 - 15.0 mmol/L    eGFR 44.2 (L) >60.0 mL/min/1.73   Comprehensive Metabolic Panel    Specimen: Arm, Right; Blood   Result Value Ref Range    Glucose 235 (H) 65 - 99 mg/dL    BUN 34 (H) 6 - 20 mg/dL    Creatinine 1.86 (H) 0.76 - 1.27 mg/dL    Sodium 139 136 - 145 mmol/L    Potassium 4.4 3.5 - 5.2 mmol/L    Chloride 104 98 - 107 mmol/L    CO2 26.0 22.0 - 29.0 mmol/L     Calcium 10.5 8.6 - 10.5 mg/dL    Total Protein 6.8 6.0 - 8.5 g/dL    Albumin 4.0 3.5 - 5.2 g/dL    ALT (SGPT) 9 1 - 41 U/L    AST (SGOT) 10 1 - 40 U/L    Alkaline Phosphatase 78 39 - 117 U/L    Total Bilirubin 0.2 0.0 - 1.2 mg/dL    Globulin 2.8 gm/dL    A/G Ratio 1.4 g/dL    BUN/Creatinine Ratio 18.3 7.0 - 25.0    Anion Gap 9.0 5.0 - 15.0 mmol/L    eGFR 42.7 (L) >60.0 mL/min/1.73   Green Top (Gel)   Result Value Ref Range    Extra Tube Hold for add-ons.    Lavender Top   Result Value Ref Range    Extra Tube hold for add-on    Gold Top - SST   Result Value Ref Range    Extra Tube Hold for add-ons.    Light Blue Top   Result Value Ref Range    Extra Tube Hold for add-ons.       CT Abdomen Pelvis Without Contrast   Final Result       1.  Proximal gastric wall thickening with lobulated configuration just   distal to the GE junction best seen on images 19 through 21, series 2.   Finding could represent an underlying polyp or mass lesion at this   location and follow-up evaluation is recommended.   2.  No renal, ureteral, or bladder stone.   3.  Slightly enlarged prostate gland.   4.  No features of cholecystitis.   5.  No features of pancreatitis.   6.  Constipation noted throughout the colon.   7.  No bowel or renal obstruction.   8.  No free fluid or free air.   9.  No abscess or hematoma.       This report was finalized on 6/15/2023 11:29 PM by Jasper Campos MD.             ED Course  ED Course as of 06/18/23 1047   u Nágel 15, 2023   2334 CT Abdomen Pelvis Without Contrast [SM]   2357 Lipase(!): 100 [SM]   2357 ALT (SGPT): 10 [SM]   2357 AST (SGOT): 9 [SM]   2357 Total Bilirubin: 0.2 [SM]   Fri Jun 16, 2023   0015 Creatinine(!): 1.95 [SM]   0015 Calcium(!): 11.8 [SM]   0015 CT Abdomen Pelvis Without Contrast  Patient is aware of constipation and lesion and is under the care of Dr. Moran for both.  [SM]   0728 Advised patient of opiate use and worsening constipation. Patient verbalized understanding and  understands that this will make it worse. Advised that he has a med regime given by GI at home to take and he will take it  [SM]      ED Course User Index  [KAYLYNN] Catherine Tim APRN                                           Medical Decision Making  Patient is a 52-year-old male with significant past medical history positive for type 2 diabetes non-insulin-dependent, GERD, hyperlipidemia, hypertension, kidney stones presenting to the ER complaints abnormal labs.  Patient reports that his PCP called him about 830 and told him to come to the ER for an elevated creatinine.  Patient has no history of kidney disease.  Patient reports that he has been seeing GI and having colonoscopies for constipation and he thinks he may be dehydrated is why his kidney function is elevated.  Patient reports that he has had a lot of bowel preps and difficulty getting his constipation controlled.  Patient sees Dr. Rivera for GI.  Patient reports that they recently did see a lesion in his colon.  Patient reports that he broke his right rib approximately 2 months ago and is still experiencing excruciating pain from that.  Patient denies fever, cough, nausea, vomiting, shortness of air or any additional symptoms today.  Patient denies any alleviating or worsening factors.    Advised patient to return to the ER with new or worsening symptoms.  Advised patient to follow-up with PCP and GI.  Patient verbalized understanding and agrees.  Vital signs are stable at discharge.  Patient is in no acute distress.    Problems Addressed:  Colonic mass: complicated acute illness or injury  Constipation, unspecified constipation type: complicated acute illness or injury  Dehydration: complicated acute illness or injury    Amount and/or Complexity of Data Reviewed  Labs: ordered. Decision-making details documented in ED Course.  Radiology: ordered. Decision-making details documented in ED Course.    Risk  Prescription drug management.        Final  diagnoses:   Dehydration   Constipation, unspecified constipation type   Colonic mass       ED Disposition  ED Disposition       ED Disposition   Discharge    Condition   Stable    Comment   --               Nicole Gonzalez, APRN  [address] [id number]  [phone]    Schedule an appointment as soon as possible for a visit in 2 days  As needed         Medication List      No changes were made to your prescriptions during this visit.            Catherine Tim, APRN  06/18/23 1047

## 2023-07-21 ENCOUNTER — TRANSCRIBE ORDERS (OUTPATIENT)
Dept: ADMINISTRATIVE | Facility: HOSPITAL | Age: 53
End: 2023-07-21
Payer: COMMERCIAL

## 2023-07-21 DIAGNOSIS — R55 SYNCOPE AND COLLAPSE: Primary | ICD-10-CM

## 2023-07-26 ENCOUNTER — TRANSCRIBE ORDERS (OUTPATIENT)
Dept: ADMINISTRATIVE | Facility: HOSPITAL | Age: 53
End: 2023-07-26
Payer: COMMERCIAL

## 2023-07-26 DIAGNOSIS — R55 SYNCOPE AND COLLAPSE: Primary | ICD-10-CM

## 2023-08-02 ENCOUNTER — HOSPITAL ENCOUNTER (OUTPATIENT)
Dept: RESPIRATORY THERAPY | Facility: HOSPITAL | Age: 53
Discharge: HOME | End: 2023-08-02
Admitting: PSYCHIATRY & NEUROLOGY
Payer: COMMERCIAL

## 2023-08-02 DIAGNOSIS — R55 SYNCOPE AND COLLAPSE: ICD-10-CM

## 2023-08-02 PROCEDURE — 95819 EEG AWAKE AND ASLEEP: CPT

## 2023-08-09 ENCOUNTER — HOSPITAL ENCOUNTER (OUTPATIENT)
Dept: RESPIRATORY THERAPY | Facility: HOSPITAL | Age: 53
Discharge: HOME | End: 2023-08-09
Admitting: PSYCHIATRY & NEUROLOGY
Payer: COMMERCIAL

## 2023-08-09 DIAGNOSIS — R55 SYNCOPE AND COLLAPSE: ICD-10-CM

## 2023-08-09 PROCEDURE — 95819 EEG AWAKE AND ASLEEP: CPT

## 2023-08-16 ENCOUNTER — HOSPITAL ENCOUNTER (OUTPATIENT)
Dept: RESPIRATORY THERAPY | Facility: HOSPITAL | Age: 53
Discharge: HOME | End: 2023-08-16
Admitting: PSYCHIATRY & NEUROLOGY
Payer: COMMERCIAL

## 2023-08-16 DIAGNOSIS — R55 SYNCOPE AND COLLAPSE: ICD-10-CM

## 2023-08-16 PROCEDURE — 95819 EEG AWAKE AND ASLEEP: CPT

## 2023-09-19 RX ORDER — METOPROLOL SUCCINATE 25 MG/1
25 TABLET, EXTENDED RELEASE ORAL DAILY
Qty: 30 TABLET | Refills: 0 | Status: SHIPPED | OUTPATIENT
Start: 2023-09-19

## 2023-09-27 ENCOUNTER — OFFICE VISIT (OUTPATIENT)
Dept: CARDIOLOGY | Facility: CLINIC | Age: 53
End: 2023-09-27
Payer: COMMERCIAL

## 2023-09-27 VITALS
HEIGHT: 71 IN | SYSTOLIC BLOOD PRESSURE: 152 MMHG | BODY MASS INDEX: 22.54 KG/M2 | DIASTOLIC BLOOD PRESSURE: 90 MMHG | HEART RATE: 90 BPM | WEIGHT: 161 LBS | OXYGEN SATURATION: 99 %

## 2023-09-27 DIAGNOSIS — I10 ESSENTIAL HYPERTENSION: ICD-10-CM

## 2023-09-27 DIAGNOSIS — E78.5 HYPERLIPIDEMIA, UNSPECIFIED HYPERLIPIDEMIA TYPE: ICD-10-CM

## 2023-09-27 DIAGNOSIS — Z72.0 TOBACCO USE: ICD-10-CM

## 2023-09-27 DIAGNOSIS — Z91.89 MULTIPLE RISK FACTORS FOR CORONARY ARTERY DISEASE: ICD-10-CM

## 2023-09-27 DIAGNOSIS — R00.0 TACHYCARDIA: Primary | ICD-10-CM

## 2023-09-27 PROCEDURE — 99214 OFFICE O/P EST MOD 30 MIN: CPT | Performed by: NURSE PRACTITIONER

## 2023-09-27 RX ORDER — ATORVASTATIN CALCIUM 20 MG/1
20 TABLET, FILM COATED ORAL DAILY
Qty: 90 TABLET | Refills: 1 | Status: SHIPPED | OUTPATIENT
Start: 2023-09-27

## 2023-09-27 NOTE — PROGRESS NOTES
Subjective     Serge Chandra is a 53 y.o. male.   Chief Complaint   Patient presents with    Rapid Heart Rate     Follow up    Hypertension     today    Med Refill     pending    History of Present Illness   Serge Chandra is a 53-year-old male who presents to clinic today for cardiology follow-up.  He is accompanied by his wife.    Tachycardia currently on Toprol XL daily. He brings in a log today with several elevated heart rates noted, BP is stable. He denies worsening palpitations, bradycardia, dizziness, lightheadedness or syncope.  He feels heart rate has improved some.  He reports compliance with medicine.  He has seen ENT and per patient report questionable M‚niŠre's disease. He has not seen neurology but plans to do so.      Hypertension currently on Norvasc 2.5 mg daily, he only takes if BP is > 140. Home readings are stable. He denies hypotension.  He denies chest pain or dyspnea.     Hyperlipidemia on Lipitor, he does admit today to not taking his cholesterol medication as he was out of refills.  He denies medication side effects.  He continues to smoke.  He is due for laboratory testing.      Underlying DM currently treated by PCP on oral medications.      He reports history of anemia and is currently on iron supplements, managed by PCP.    Patient Active Problem List   Diagnosis    Essential hypertension    Hyperlipidemia    Type 2 diabetes mellitus without complication, without long-term current use of insulin    Tobacco use    Multiple risk factors for coronary artery disease    Syncope and collapse    Fracture of rib of right side with delayed healing     Past Medical History:   Diagnosis Date    Diabetes mellitus 07/2010    GERD (gastroesophageal reflux disease)     Hyperlipidemia 10/27/2022    Hypertension 09/2021    Kidney stone      Past Surgical History:   Procedure Laterality Date    KIDNEY SURGERY       Family History   Problem Relation Age of Onset    COPD Mother     Lung cancer Mother      Arthritis Mother     Diabetes Father     Stroke Father     Parkinsonism Father     Hypertension Brother     Hypertension Brother     Heart attack Maternal Grandfather     Stroke Paternal Grandmother      Social History     Tobacco Use    Smoking status: Every Day     Packs/day: 0.50     Years: 30.00     Additional pack years: 0.00     Total pack years: 15.00     Types: Cigarettes    Smokeless tobacco: Never   Vaping Use    Vaping Use: Never used   Substance Use Topics    Alcohol use: Not Currently    Drug use: Not Currently     The following portions of the patient's history were reviewed and updated as appropriate: allergies, current medications, past family history, past medical history, past social history, past surgical history and problem list.    Allergies   Allergen Reactions    Bee Venom Swelling     States yellow jackets         Current Outpatient Medications:     amLODIPine (NORVASC) 2.5 MG tablet, Take 1 tablet by mouth Daily. Take if BP > 140, Disp: 30 tablet, Rfl:     atorvastatin (LIPITOR) 20 MG tablet, Take 1 tablet by mouth Daily., Disp: 90 tablet, Rfl: 1    ferrous sulfate 324 (65 Fe) MG tablet delayed-release EC tablet, Take 1 tablet by mouth Daily With Breakfast., Disp: , Rfl:     gabapentin (NEURONTIN) 300 MG capsule, Take 1 capsule by mouth 2 (Two) Times a Day., Disp: , Rfl:     glyburide (DIAbeta) 5 MG tablet, Take 2 tablets by mouth 2 (Two) Times a Day., Disp: , Rfl:     metFORMIN (GLUCOPHAGE) 1000 MG tablet, Take 1 tablet by mouth 2 (Two) Times a Day., Disp: , Rfl:     ofloxacin (FLOXIN) 0.3 % otic solution, As Needed., Disp: , Rfl:     pantoprazole (PROTONIX) 40 MG EC tablet, Take 1 tablet by mouth Daily., Disp: , Rfl:     metoprolol tartrate (LOPRESSOR) 25 MG tablet, Take 0.5 tablets by mouth 2 (Two) Times a Day., Disp: 60 tablet, Rfl: 3    Review of Systems   Constitutional:  Negative for activity change, appetite change, chills, diaphoresis, fatigue and fever.   HENT:  Negative for  "congestion, drooling, ear discharge, ear pain, mouth sores, nosebleeds, postnasal drip, rhinorrhea, sinus pressure, sneezing and sore throat.    Eyes:  Negative for pain, discharge and visual disturbance.   Respiratory:  Negative for cough, chest tightness, shortness of breath and wheezing.    Cardiovascular:  Positive for palpitations. Negative for chest pain and leg swelling.   Gastrointestinal:  Negative for abdominal pain, constipation, diarrhea, nausea and vomiting.   Endocrine: Negative for cold intolerance, heat intolerance, polydipsia, polyphagia and polyuria.   Musculoskeletal:  Negative for arthralgias, myalgias and neck pain.   Skin:  Negative for rash and wound.   Neurological:  Negative for syncope, speech difficulty, weakness, light-headedness and headaches.   Hematological:  Negative for adenopathy. Does not bruise/bleed easily.   Psychiatric/Behavioral:  Negative for confusion, dysphoric mood and sleep disturbance. The patient is not nervous/anxious.    All other systems reviewed and are negative.    /90 (BP Location: Left arm, Patient Position: Sitting, Cuff Size: Adult)   Pulse 90   Ht 180.3 cm (71\")   Wt 73 kg (161 lb)   SpO2 99%   BMI 22.45 kg/mý   BP recheck 134/76    Objective   Allergies   Allergen Reactions    Bee Venom Swelling     States yellow jackets       Physical Exam  Vitals reviewed.   Constitutional:       Appearance: Normal appearance. He is well-developed.   HENT:      Head: Normocephalic.   Eyes:      Conjunctiva/sclera: Conjunctivae normal.   Neck:      Thyroid: No thyromegaly.      Vascular: No carotid bruit or JVD.   Cardiovascular:      Rate and Rhythm: Normal rate and regular rhythm.   Pulmonary:      Effort: Pulmonary effort is normal.      Breath sounds: Normal breath sounds.   Musculoskeletal:      Cervical back: Neck supple.      Right lower leg: No edema.      Left lower leg: No edema.   Skin:     General: Skin is warm and dry.   Neurological:      Mental " Status: He is alert and oriented to person, place, and time.   Psychiatric:         Attention and Perception: Attention normal.         Mood and Affect: Mood normal.         Speech: Speech normal.         Behavior: Behavior normal. Behavior is cooperative.         Cognition and Memory: Cognition normal.         LABS  WBC   Date Value Ref Range Status   06/15/2023 11.21 (H) 3.40 - 10.80 10*3/mm3 Final     RBC   Date Value Ref Range Status   06/15/2023 4.48 4.14 - 5.80 10*6/mm3 Final     Hemoglobin   Date Value Ref Range Status   06/15/2023 13.1 13.0 - 17.7 g/dL Final     Hematocrit   Date Value Ref Range Status   06/15/2023 40.0 37.5 - 51.0 % Final     MCV   Date Value Ref Range Status   06/15/2023 89.3 79.0 - 97.0 fL Final     MCH   Date Value Ref Range Status   06/15/2023 29.2 26.6 - 33.0 pg Final     MCHC   Date Value Ref Range Status   06/15/2023 32.8 31.5 - 35.7 g/dL Final     RDW   Date Value Ref Range Status   06/15/2023 15.7 (H) 12.3 - 15.4 % Final     RDW-SD   Date Value Ref Range Status   06/15/2023 51.8 37.0 - 54.0 fl Final     MPV   Date Value Ref Range Status   06/15/2023 11.2 6.0 - 12.0 fL Final     Platelets   Date Value Ref Range Status   06/15/2023 242 140 - 450 10*3/mm3 Final     Neutrophil %   Date Value Ref Range Status   06/15/2023 68.8 42.7 - 76.0 % Final     Lymphocyte %   Date Value Ref Range Status   06/15/2023 21.2 19.6 - 45.3 % Final     Monocyte %   Date Value Ref Range Status   06/15/2023 7.3 5.0 - 12.0 % Final     Eosinophil %   Date Value Ref Range Status   06/15/2023 1.8 0.3 - 6.2 % Final     Basophil %   Date Value Ref Range Status   06/15/2023 0.6 0.0 - 1.5 % Final     Immature Grans %   Date Value Ref Range Status   06/15/2023 0.3 0.0 - 0.5 % Final     Neutrophils, Absolute   Date Value Ref Range Status   06/15/2023 7.71 (H) 1.70 - 7.00 10*3/mm3 Final     Lymphocytes, Absolute   Date Value Ref Range Status   06/15/2023 2.38 0.70 - 3.10 10*3/mm3 Final     Monocytes, Absolute   Date  Value Ref Range Status   06/15/2023 0.82 0.10 - 0.90 10*3/mm3 Final     Eosinophils, Absolute   Date Value Ref Range Status   06/15/2023 0.20 0.00 - 0.40 10*3/mm3 Final     Basophils, Absolute   Date Value Ref Range Status   06/15/2023 0.07 0.00 - 0.20 10*3/mm3 Final     Immature Grans, Absolute   Date Value Ref Range Status   06/15/2023 0.03 0.00 - 0.05 10*3/mm3 Final     nRBC   Date Value Ref Range Status   06/15/2023 0.0 0.0 - 0.2 /100 WBC Final     IMAGING  EEG    Result Date: 8/16/2023  Normal study This report is transcribed using the Dragon dictation system.      EEG    Result Date: 8/9/2023  Normal study This report is transcribed using the Dragon dictation system.      CT Abdomen Pelvis With & Without Contrast    Result Date: 6/7/2023   1. Prominence of the pancreatic head. Recommend GI follow-up and consider ERCP.  2.Moderate to large stool burden       This report was finalized on 6/7/2023 8:49 AM by Dr. Carlo Washington MD.      CT Abdomen Pelvis Without Contrast    Result Date: 6/15/2023   1.  Proximal gastric wall thickening with lobulated configuration just distal to the GE junction best seen on images 19 through 21, series 2. Finding could represent an underlying polyp or mass lesion at this location and follow-up evaluation is recommended. 2.  No renal, ureteral, or bladder stone. 3.  Slightly enlarged prostate gland. 4.  No features of cholecystitis. 5.  No features of pancreatitis. 6.  Constipation noted throughout the colon. 7.  No bowel or renal obstruction. 8.  No free fluid or free air. 9.  No abscess or hematoma.  This report was finalized on 6/15/2023 11:29 PM by Jasper Campos MD.      CT Chest With & Without Contrast Diagnostic    Result Date: 6/7/2023   1. Subacute appearing right sixth through 10th rib fractures. 2. No parenchymal nodules, pneumothorax, effusions, or adenopathy  This report was finalized on 6/7/2023 8:54 AM by Dr. Carlo Washington MD.      MRI Brain With & Without  Contrast    Result Date: 7/5/2023  1.  No pathologic contrast enhancement along the visualized 8th cranial nerve pathways. 2.  No enhancing mass lesions in the CP angle or IAC regions. 3. No acute intracranial findings. Otherwise unremarkable exam.  This report was finalized on 7/5/2023 11:38 AM by Dr. Higinio Quinonez MD.      MRI abdomen wo contrast mrcp    Result Date: 6/28/2023  Unremarkable MRI of the abdomen. No source for the patient's symptoms identified.   This report was finalized on 6/28/2023 3:34 PM by Dr. Carlo Washington MD.              Assessment & Plan   Diagnoses and all orders for this visit:    1. Tachycardia (Primary)  Stop Metoprolol XL and start Lopressor 12.5 mg twice daily and plan to increase if heart rate and BP tolerate.  Was encouraged to closely monitor vitals.  Recommend avoidance of caffeine/stimulants     2. Hyperlipidemia, unspecified hyperlipidemia type  -     Lipid Panel; Future  -     CK; Future  -     Comprehensive Metabolic Panel; Future  Patient is agreeable to restart Lipitor, refills authorized, fasting labs recommended    3. Essential hypertension  Slightly elevated in clinic today however we will continue to monitor with medication changes, sodium restrictions, routine monitoring    4. Multiple risk factors for coronary artery disease  Asymptomatic, will continue to monitor    5. Tobacco use  Recommend avoidance of tobacco    Lifestyle modifications including heart healthy diet, regular exercise, maintenance of desirable body weight and avoidance of tobacco brought    Follow-up in 3 months, sooner if needed    Other orders  -     atorvastatin (LIPITOR) 20 MG tablet; Take 1 tablet by mouth Daily.  Dispense: 90 tablet; Refill: 1  -     metoprolol tartrate (LOPRESSOR) 25 MG tablet; Take 0.5 tablets by mouth 2 (Two) Times a Day.  Dispense: 60 tablet; Refill: 3

## 2023-10-24 ENCOUNTER — APPOINTMENT (OUTPATIENT)
Dept: CT IMAGING | Facility: HOSPITAL | Age: 53
End: 2023-10-24
Payer: COMMERCIAL

## 2023-10-24 ENCOUNTER — HOSPITAL ENCOUNTER (EMERGENCY)
Facility: HOSPITAL | Age: 53
Discharge: HOME | End: 2023-10-24
Attending: EMERGENCY MEDICINE | Admitting: EMERGENCY MEDICINE
Payer: COMMERCIAL

## 2023-10-24 VITALS
TEMPERATURE: 98.4 F | SYSTOLIC BLOOD PRESSURE: 133 MMHG | DIASTOLIC BLOOD PRESSURE: 69 MMHG | HEIGHT: 72 IN | OXYGEN SATURATION: 99 % | RESPIRATION RATE: 17 BRPM | BODY MASS INDEX: 21.67 KG/M2 | WEIGHT: 160 LBS | HEART RATE: 61 BPM

## 2023-10-24 DIAGNOSIS — R55 SYNCOPE AND COLLAPSE: Primary | ICD-10-CM

## 2023-10-24 DIAGNOSIS — S09.90XA INJURY OF HEAD, INITIAL ENCOUNTER: ICD-10-CM

## 2023-10-24 LAB
ALBUMIN SERPL-MCNC: 4.4 G/DL (ref 3.5–5.2)
ALBUMIN/GLOB SERPL: 1.6 G/DL
ALP SERPL-CCNC: 71 U/L (ref 39–117)
ALT SERPL W P-5'-P-CCNC: 16 U/L (ref 1–41)
ANION GAP SERPL CALCULATED.3IONS-SCNC: 11.4 MMOL/L (ref 5–15)
AST SERPL-CCNC: 13 U/L (ref 1–40)
BACTERIA UR QL AUTO: ABNORMAL /HPF
BASOPHILS # BLD AUTO: 0.09 10*3/MM3 (ref 0–0.2)
BASOPHILS NFR BLD AUTO: 0.8 % (ref 0–1.5)
BILIRUB SERPL-MCNC: 0.3 MG/DL (ref 0–1.2)
BILIRUB UR QL STRIP: NEGATIVE
BUN SERPL-MCNC: 18 MG/DL (ref 6–20)
BUN/CREAT SERPL: 17.5 (ref 7–25)
CALCIUM SPEC-SCNC: 9.4 MG/DL (ref 8.6–10.5)
CHLORIDE SERPL-SCNC: 107 MMOL/L (ref 98–107)
CLARITY UR: CLEAR
CO2 SERPL-SCNC: 22.6 MMOL/L (ref 22–29)
COLOR UR: YELLOW
CREAT SERPL-MCNC: 1.03 MG/DL (ref 0.76–1.27)
CRP SERPL-MCNC: <0.3 MG/DL (ref 0–0.5)
DEPRECATED RDW RBC AUTO: 41.8 FL (ref 37–54)
EGFRCR SERPLBLD CKD-EPI 2021: 86.3 ML/MIN/1.73
EOSINOPHIL # BLD AUTO: 0.33 10*3/MM3 (ref 0–0.4)
EOSINOPHIL NFR BLD AUTO: 3 % (ref 0.3–6.2)
ERYTHROCYTE [DISTWIDTH] IN BLOOD BY AUTOMATED COUNT: 12 % (ref 12.3–15.4)
GEN 5 2HR TROPONIN T REFLEX: <6 NG/L
GLOBULIN UR ELPH-MCNC: 2.7 GM/DL
GLUCOSE BLDC GLUCOMTR-MCNC: 165 MG/DL (ref 70–130)
GLUCOSE SERPL-MCNC: 169 MG/DL (ref 65–99)
GLUCOSE UR STRIP-MCNC: ABNORMAL MG/DL
HCT VFR BLD AUTO: 39.2 % (ref 37.5–51)
HGB BLD-MCNC: 13.2 G/DL (ref 13–17.7)
HGB UR QL STRIP.AUTO: ABNORMAL
HOLD SPECIMEN: NORMAL
HOLD SPECIMEN: NORMAL
HYALINE CASTS UR QL AUTO: ABNORMAL /LPF
IMM GRANULOCYTES # BLD AUTO: 0.06 10*3/MM3 (ref 0–0.05)
IMM GRANULOCYTES NFR BLD AUTO: 0.5 % (ref 0–0.5)
KETONES UR QL STRIP: NEGATIVE
LEUKOCYTE ESTERASE UR QL STRIP.AUTO: NEGATIVE
LYMPHOCYTES # BLD AUTO: 1.78 10*3/MM3 (ref 0.7–3.1)
LYMPHOCYTES NFR BLD AUTO: 16.3 % (ref 19.6–45.3)
MCH RBC QN AUTO: 31.6 PG (ref 26.6–33)
MCHC RBC AUTO-ENTMCNC: 33.7 G/DL (ref 31.5–35.7)
MCV RBC AUTO: 93.8 FL (ref 79–97)
MONOCYTES # BLD AUTO: 0.76 10*3/MM3 (ref 0.1–0.9)
MONOCYTES NFR BLD AUTO: 7 % (ref 5–12)
NEUTROPHILS NFR BLD AUTO: 7.9 10*3/MM3 (ref 1.7–7)
NEUTROPHILS NFR BLD AUTO: 72.4 % (ref 42.7–76)
NITRITE UR QL STRIP: NEGATIVE
NRBC BLD AUTO-RTO: 0 /100 WBC (ref 0–0.2)
PH UR STRIP.AUTO: 6 [PH] (ref 5–8)
PLATELET # BLD AUTO: 186 10*3/MM3 (ref 140–450)
PMV BLD AUTO: 11.3 FL (ref 6–12)
POTASSIUM SERPL-SCNC: 4.7 MMOL/L (ref 3.5–5.2)
PROT SERPL-MCNC: 7.1 G/DL (ref 6–8.5)
PROT UR QL STRIP: ABNORMAL
RBC # BLD AUTO: 4.18 10*6/MM3 (ref 4.14–5.8)
RBC # UR STRIP: ABNORMAL /HPF
REF LAB TEST METHOD: ABNORMAL
SODIUM SERPL-SCNC: 141 MMOL/L (ref 136–145)
SP GR UR STRIP: 1.03 (ref 1–1.03)
SQUAMOUS #/AREA URNS HPF: ABNORMAL /HPF
TROPONIN T DELTA: NORMAL
TROPONIN T SERPL HS-MCNC: <6 NG/L
UROBILINOGEN UR QL STRIP: ABNORMAL
WBC # UR STRIP: ABNORMAL /HPF
WBC NRBC COR # BLD: 10.92 10*3/MM3 (ref 3.4–10.8)
WHOLE BLOOD HOLD COAG: NORMAL
WHOLE BLOOD HOLD SPECIMEN: NORMAL

## 2023-10-24 PROCEDURE — 86140 C-REACTIVE PROTEIN: CPT | Performed by: PHYSICIAN ASSISTANT

## 2023-10-24 PROCEDURE — 36415 COLL VENOUS BLD VENIPUNCTURE: CPT

## 2023-10-24 PROCEDURE — 93010 ELECTROCARDIOGRAM REPORT: CPT | Performed by: SPECIALIST

## 2023-10-24 PROCEDURE — 80053 COMPREHEN METABOLIC PANEL: CPT | Performed by: PHYSICIAN ASSISTANT

## 2023-10-24 PROCEDURE — 84484 ASSAY OF TROPONIN QUANT: CPT | Performed by: PHYSICIAN ASSISTANT

## 2023-10-24 PROCEDURE — 25810000003 SODIUM CHLORIDE 0.9 % SOLUTION: Performed by: PHYSICIAN ASSISTANT

## 2023-10-24 PROCEDURE — 81001 URINALYSIS AUTO W/SCOPE: CPT | Performed by: PHYSICIAN ASSISTANT

## 2023-10-24 PROCEDURE — 82948 REAGENT STRIP/BLOOD GLUCOSE: CPT

## 2023-10-24 PROCEDURE — 70450 CT HEAD/BRAIN W/O DYE: CPT | Performed by: RADIOLOGY

## 2023-10-24 PROCEDURE — 99284 EMERGENCY DEPT VISIT MOD MDM: CPT

## 2023-10-24 PROCEDURE — 85025 COMPLETE CBC W/AUTO DIFF WBC: CPT | Performed by: PHYSICIAN ASSISTANT

## 2023-10-24 PROCEDURE — 93005 ELECTROCARDIOGRAM TRACING: CPT | Performed by: EMERGENCY MEDICINE

## 2023-10-24 PROCEDURE — 72125 CT NECK SPINE W/O DYE: CPT | Performed by: RADIOLOGY

## 2023-10-24 PROCEDURE — 72125 CT NECK SPINE W/O DYE: CPT

## 2023-10-24 PROCEDURE — 70450 CT HEAD/BRAIN W/O DYE: CPT

## 2023-10-24 RX ORDER — SODIUM CHLORIDE 0.9 % (FLUSH) 0.9 %
10 SYRINGE (ML) INJECTION AS NEEDED
Status: DISCONTINUED | OUTPATIENT
Start: 2023-10-24 | End: 2023-10-24 | Stop reason: HOSPADM

## 2023-10-24 RX ADMIN — SODIUM CHLORIDE 1000 ML: 9 INJECTION, SOLUTION INTRAVENOUS at 17:51

## 2023-10-24 NOTE — ED PROVIDER NOTES
Subjective   History of Present Illness  53-year-old male with past medical history of diabetes, GERD, hypertension, hyperlipidemia, nephrolithiasis presents to the emergency room after syncopal episode 1 hour prior to arrival.  Patient states he was at work and began feeling dizzy and weak when he had syncope and collapse.  He does state that he struck his posterior head.  He denies any anticoagulant use.  He states 2 to 3 weeks ago he began taking metoprolol and is unsure if that is what is induced this, however states for the past several years he has had recurrent episodes of syncope and collapse and been seen by cardiology and neurology without any etiology of his symptoms.  He denies any recent illness.  He denies visual changes, headache, chest pain, shortness of breath, or back pain.  Denies any alleviating factors.  Denies any other complaints or concerns at this time.                                                                                                                                                                                                                                                                                                                                                                 History provided by:  Patient   used: No        Review of Systems   Constitutional: Negative.  Negative for fever.   HENT: Negative.     Respiratory: Negative.     Cardiovascular: Negative.  Negative for chest pain.   Gastrointestinal: Negative.  Negative for abdominal pain.   Endocrine: Negative.    Genitourinary: Negative.  Negative for dysuria.   Skin: Negative.    Neurological:  Positive for syncope.   Psychiatric/Behavioral: Negative.     All other systems reviewed and are negative.      Past Medical History:   Diagnosis Date    Diabetes mellitus 07/2010    GERD (gastroesophageal reflux disease)     Hyperlipidemia 10/27/2022    Hypertension 09/2021    Kidney stone         Allergies   Allergen Reactions    Bee Venom Swelling     States yellow jackets       Past Surgical History:   Procedure Laterality Date    KIDNEY SURGERY         Family History   Problem Relation Age of Onset    COPD Mother     Lung cancer Mother     Arthritis Mother     Diabetes Father     Stroke Father     Parkinsonism Father     Hypertension Brother     Hypertension Brother     Heart attack Maternal Grandfather     Stroke Paternal Grandmother        Social History     Socioeconomic History    Marital status:     Number of children: 1   Tobacco Use    Smoking status: Every Day     Packs/day: 0.50     Years: 30.00     Additional pack years: 0.00     Total pack years: 15.00     Types: Cigarettes    Smokeless tobacco: Never   Vaping Use    Vaping Use: Never used   Substance and Sexual Activity    Alcohol use: Not Currently    Drug use: Not Currently    Sexual activity: Yes     Partners: Female     Birth control/protection: Other           Objective   Physical Exam  Vitals and nursing note reviewed.   Constitutional:       General: He is not in acute distress.     Appearance: He is well-developed. He is not diaphoretic.   HENT:      Head: Normocephalic and atraumatic.      Right Ear: External ear normal.      Left Ear: External ear normal.      Nose: Nose normal.   Eyes:      Conjunctiva/sclera: Conjunctivae normal.      Pupils: Pupils are equal, round, and reactive to light.   Neck:      Vascular: No JVD.      Trachea: No tracheal deviation.   Cardiovascular:      Rate and Rhythm: Normal rate and regular rhythm.      Heart sounds: Normal heart sounds. No murmur heard.  Pulmonary:      Effort: Pulmonary effort is normal. No respiratory distress.      Breath sounds: Normal breath sounds. No wheezing.   Abdominal:      General: Bowel sounds are normal.      Palpations: Abdomen is soft.      Tenderness: There is no abdominal tenderness.   Musculoskeletal:         General: No deformity. Normal range of  motion.      Cervical back: Normal range of motion and neck supple.   Skin:     General: Skin is warm and dry.      Coloration: Skin is not pale.      Findings: No erythema or rash.   Neurological:      Mental Status: He is alert and oriented to person, place, and time.      Cranial Nerves: No cranial nerve deficit.   Psychiatric:         Behavior: Behavior normal.         Thought Content: Thought content normal.         Procedures           ED Course  ED Course as of 10/24/23 2203   Tue Oct 24, 2023   1532 ECG 12 Lead Syncope  Normal sinus rhythm, rate 79, QTc 419, no acute ST or T wave changes [CW]   1846 CT Head Without Contrast [TK]   1935 CT Cervical Spine Without Contrast [TK]      ED Course User Index  [CW] Kevin Marie DO  [TK] French Salcedo PA-C           Results for orders placed or performed during the hospital encounter of 10/24/23   Comprehensive Metabolic Panel    Specimen: Blood   Result Value Ref Range    Glucose 169 (H) 65 - 99 mg/dL    BUN 18 6 - 20 mg/dL    Creatinine 1.03 0.76 - 1.27 mg/dL    Sodium 141 136 - 145 mmol/L    Potassium 4.7 3.5 - 5.2 mmol/L    Chloride 107 98 - 107 mmol/L    CO2 22.6 22.0 - 29.0 mmol/L    Calcium 9.4 8.6 - 10.5 mg/dL    Total Protein 7.1 6.0 - 8.5 g/dL    Albumin 4.4 3.5 - 5.2 g/dL    ALT (SGPT) 16 1 - 41 U/L    AST (SGOT) 13 1 - 40 U/L    Alkaline Phosphatase 71 39 - 117 U/L    Total Bilirubin 0.3 0.0 - 1.2 mg/dL    Globulin 2.7 gm/dL    A/G Ratio 1.6 g/dL    BUN/Creatinine Ratio 17.5 7.0 - 25.0    Anion Gap 11.4 5.0 - 15.0 mmol/L    eGFR 86.3 >60.0 mL/min/1.73   C-reactive Protein    Specimen: Blood   Result Value Ref Range    C-Reactive Protein <0.30 0.00 - 0.50 mg/dL   Urinalysis With Microscopic If Indicated (No Culture) - Urine, Clean Catch    Specimen: Urine, Clean Catch   Result Value Ref Range    Color, UA Yellow Yellow, Straw    Appearance, UA Clear Clear    pH, UA 6.0 5.0 - 8.0    Specific Gravity, UA 1.028 1.005 - 1.030    Glucose, UA  >=1000 mg/dL (3+) (A) Negative    Ketones, UA Negative Negative    Bilirubin, UA Negative Negative    Blood, UA Moderate (2+) (A) Negative    Protein, UA Trace (A) Negative    Leuk Esterase, UA Negative Negative    Nitrite, UA Negative Negative    Urobilinogen, UA 0.2 E.U./dL 0.2 - 1.0 E.U./dL   High Sensitivity Troponin T    Specimen: Blood   Result Value Ref Range    HS Troponin T <6 <15 ng/L   CBC Auto Differential    Specimen: Blood   Result Value Ref Range    WBC 10.92 (H) 3.40 - 10.80 10*3/mm3    RBC 4.18 4.14 - 5.80 10*6/mm3    Hemoglobin 13.2 13.0 - 17.7 g/dL    Hematocrit 39.2 37.5 - 51.0 %    MCV 93.8 79.0 - 97.0 fL    MCH 31.6 26.6 - 33.0 pg    MCHC 33.7 31.5 - 35.7 g/dL    RDW 12.0 (L) 12.3 - 15.4 %    RDW-SD 41.8 37.0 - 54.0 fl    MPV 11.3 6.0 - 12.0 fL    Platelets 186 140 - 450 10*3/mm3    Neutrophil % 72.4 42.7 - 76.0 %    Lymphocyte % 16.3 (L) 19.6 - 45.3 %    Monocyte % 7.0 5.0 - 12.0 %    Eosinophil % 3.0 0.3 - 6.2 %    Basophil % 0.8 0.0 - 1.5 %    Immature Grans % 0.5 0.0 - 0.5 %    Neutrophils, Absolute 7.90 (H) 1.70 - 7.00 10*3/mm3    Lymphocytes, Absolute 1.78 0.70 - 3.10 10*3/mm3    Monocytes, Absolute 0.76 0.10 - 0.90 10*3/mm3    Eosinophils, Absolute 0.33 0.00 - 0.40 10*3/mm3    Basophils, Absolute 0.09 0.00 - 0.20 10*3/mm3    Immature Grans, Absolute 0.06 (H) 0.00 - 0.05 10*3/mm3    nRBC 0.0 0.0 - 0.2 /100 WBC   Urinalysis, Microscopic Only - Urine, Clean Catch    Specimen: Urine, Clean Catch   Result Value Ref Range    RBC, UA Too Numerous to Count (A) None Seen, 0-2 /HPF    WBC, UA 3-5 (A) None Seen, 0-2 /HPF    Bacteria, UA None Seen None Seen /HPF    Squamous Epithelial Cells, UA 0-2 None Seen, 0-2 /HPF    Hyaline Casts, UA None Seen None Seen /LPF    Methodology Automated Microscopy    High Sensitivity Troponin T 2Hr    Specimen: Arm, Left; Blood   Result Value Ref Range    HS Troponin T <6 <15 ng/L    Troponin T Delta     POC Glucose Once    Specimen: Blood   Result Value Ref Range     Glucose 165 (H) 70 - 130 mg/dL   ECG 12 Lead Syncope   Result Value Ref Range    QT Interval 366 ms    QTC Interval 419 ms   Green Top (Gel)   Result Value Ref Range    Extra Tube Hold for add-ons.    Lavender Top   Result Value Ref Range    Extra Tube hold for add-on    Gold Top - SST   Result Value Ref Range    Extra Tube Hold for add-ons.    Light Blue Top   Result Value Ref Range    Extra Tube Hold for add-ons.        CT Head Without Contrast   Final Result     Unremarkable exam demonstrating no CT evidence of acute intracranial   findings.       This report was finalized on 10/24/2023 5:06 PM by Dr. Carlo Washington MD.          CT Cervical Spine Without Contrast   Final Result       1. No acute bony abnormality.       2. Other incidental findings as above       This report was finalized on 10/24/2023 5:07 PM by Dr. Carlo Washington MD.                                              Medical Decision Making  53-year-old male with past medical history of diabetes, GERD, hypertension, hyperlipidemia, nephrolithiasis presents to the emergency room after syncopal episode 1 hour prior to arrival.  Patient states he was at work and began feeling dizzy and weak when he had syncope and collapse.  He does state that he struck his posterior head.  He denies any anticoagulant use.  He states 2 to 3 weeks ago he began taking metoprolol and is unsure if that is what is induced this, however states for the past several years he has had recurrent episodes of syncope and collapse and been seen by cardiology and neurology without any etiology of his symptoms.  He denies any recent illness.  He denies visual changes, headache, chest pain, shortness of breath, or back pain.  Denies any alleviating factors.  Denies any other complaints or concerns at this time.                                                                                                                                                                                                                                                                                                                                                                    Problems Addressed:  Injury of head, initial encounter: complicated acute illness or injury  Syncope and collapse: complicated acute illness or injury    Amount and/or Complexity of Data Reviewed  Labs: ordered. Decision-making details documented in ED Course.  Radiology: ordered. Decision-making details documented in ED Course.  ECG/medicine tests: ordered. Decision-making details documented in ED Course.    Risk  Prescription drug management.        Final diagnoses:   None       ED Disposition  ED Disposition       None            No follow-up provider specified.       Medication List      No changes were made to your prescriptions during this visit.            French Salcedo PA-C  10/24/23 2153

## 2023-10-25 LAB
QT INTERVAL: 366 MS
QTC INTERVAL: 419 MS

## 2023-10-31 ENCOUNTER — OFFICE VISIT (OUTPATIENT)
Dept: CARDIOLOGY | Facility: CLINIC | Age: 53
End: 2023-10-31
Payer: COMMERCIAL

## 2023-10-31 VITALS
DIASTOLIC BLOOD PRESSURE: 85 MMHG | SYSTOLIC BLOOD PRESSURE: 138 MMHG | OXYGEN SATURATION: 98 % | BODY MASS INDEX: 21.7 KG/M2 | WEIGHT: 160.2 LBS | HEART RATE: 83 BPM | HEIGHT: 72 IN

## 2023-10-31 DIAGNOSIS — E78.5 HYPERLIPIDEMIA, UNSPECIFIED HYPERLIPIDEMIA TYPE: ICD-10-CM

## 2023-10-31 DIAGNOSIS — I10 ESSENTIAL HYPERTENSION: ICD-10-CM

## 2023-10-31 DIAGNOSIS — E11.9 TYPE 2 DIABETES MELLITUS WITHOUT COMPLICATION, WITHOUT LONG-TERM CURRENT USE OF INSULIN: ICD-10-CM

## 2023-10-31 DIAGNOSIS — D72.829 LEUKOCYTOSIS, UNSPECIFIED TYPE: ICD-10-CM

## 2023-10-31 DIAGNOSIS — Z91.89 MULTIPLE RISK FACTORS FOR CORONARY ARTERY DISEASE: ICD-10-CM

## 2023-10-31 DIAGNOSIS — Z72.0 TOBACCO USE: ICD-10-CM

## 2023-10-31 DIAGNOSIS — R00.0 TACHYCARDIA: Primary | ICD-10-CM

## 2023-10-31 DIAGNOSIS — R55 SYNCOPE AND COLLAPSE: ICD-10-CM

## 2023-10-31 PROCEDURE — 99214 OFFICE O/P EST MOD 30 MIN: CPT | Performed by: NURSE PRACTITIONER

## 2023-10-31 NOTE — PROGRESS NOTES
Subjective     Serge Chandra is a 53 y.o. male.   Chief Complaint   Patient presents with    Follow-up     ED Follow up       History of Present Illness   Serge Chandra is a 53-year-old male who presents to the clinic today for cardiology follow-up.  He is accompanied by his wife, Dyan.     Sinus tachycardia recently changed long-acting Toprol to short acting due to persistently elevated heart rate.  He reports heart rates have been stable and denies any worsening tachycardia or bradycardia (no log available for review today) He feels he was doing well and stable with 1/2 pill twice a day but when he tried to increase it to 1 pill twice a day he started to have side effects and has recently had another syncopal episode. He reports he has seen both ENT and neurology for complete work-up of the syncopal episodes with all testing normal per patient report, medical records are unavailable for review.  This recent syncopal episode was the first episode in several months. He was seen at [facility] for this episode.  He does report he may have taken double doses of his medicines the am of his event, however he can't recall.      Hypertension currently on Norvasc 2.5 mg daily, he only takes if BP is > 140. Home readings are stable, he denies hypotension.  He denies chest pain or dyspnea.  He denies any need to utilize Norvasc recently as his blood pressure has been well controlled generally around 120 over 70s to 80s.     Hyperlipidemia on Lipitor, he reports compliance with medication and denies medicine side effects.  He was due to have labs prior to today's appointment however he was unable to have this done. He continues to smoke.       Underlying DM currently treated by PCP on oral medications.      He reports history of anemia and is currently on iron supplements, managed by PCP.    Patient Active Problem List   Diagnosis    Essential hypertension    Hyperlipidemia    Type 2 diabetes mellitus without  complication, without long-term current use of insulin    Tobacco use    Multiple risk factors for coronary artery disease    Syncope and collapse    Fracture of rib of right side with delayed healing     Past Medical History:   Diagnosis Date    Diabetes mellitus 07/2010    GERD (gastroesophageal reflux disease)     Hyperlipidemia 10/27/2022    Hypertension 09/2021    Kidney stone      Past Surgical History:   Procedure Laterality Date    KIDNEY SURGERY         Family History   Problem Relation Age of Onset    COPD Mother     Lung cancer Mother     Arthritis Mother     Diabetes Father     Stroke Father     Parkinsonism Father     Hypertension Brother     Hypertension Brother     Heart attack Maternal Grandfather     Stroke Paternal Grandmother      Social History     Tobacco Use    Smoking status: Every Day     Packs/day: 0.50     Years: 30.00     Additional pack years: 0.00     Total pack years: 15.00     Types: Cigarettes    Smokeless tobacco: Never   Vaping Use    Vaping Use: Never used   Substance Use Topics    Alcohol use: Not Currently    Drug use: Not Currently     The following portions of the patient's history were reviewed and updated as appropriate: allergies, current medications, past family history, past medical history, past social history, past surgical history and problem list.    Allergies   Allergen Reactions    Bee Venom Swelling     States yellow jackets         Current Outpatient Medications:     amLODIPine (NORVASC) 2.5 MG tablet, Take 1 tablet by mouth Daily. Take if BP > 140, Disp: 30 tablet, Rfl:     atorvastatin (LIPITOR) 20 MG tablet, Take 1 tablet by mouth Daily., Disp: 90 tablet, Rfl: 1    ferrous sulfate 324 (65 Fe) MG tablet delayed-release EC tablet, Take 1 tablet by mouth Daily With Breakfast., Disp: , Rfl:     gabapentin (NEURONTIN) 300 MG capsule, Take 1 capsule by mouth 2 (Two) Times a Day., Disp: , Rfl:     glyburide (DIAbeta) 5 MG tablet, Take 2 tablets by mouth 2 (Two) Times a  "Day., Disp: , Rfl:     metFORMIN (GLUCOPHAGE) 1000 MG tablet, Take 1 tablet by mouth 2 (Two) Times a Day., Disp: , Rfl:     metoprolol tartrate (LOPRESSOR) 25 MG tablet, Take 0.5 tablets by mouth 2 (Two) Times a Day., Disp: 60 tablet, Rfl: 3    ofloxacin (FLOXIN) 0.3 % otic solution, As Needed., Disp: , Rfl:     pantoprazole (PROTONIX) 40 MG EC tablet, Take 1 tablet by mouth Daily., Disp: , Rfl:     Review of Systems   Constitutional:  Negative for activity change, appetite change, chills, diaphoresis, fatigue and fever.   HENT:  Negative for congestion, drooling, ear discharge, ear pain, mouth sores, nosebleeds, postnasal drip, rhinorrhea, sinus pressure, sneezing and sore throat.    Eyes:  Negative for pain, discharge and visual disturbance.   Respiratory:  Negative for cough, chest tightness, shortness of breath and wheezing.    Cardiovascular:  Positive for palpitations (tachycardia). Negative for chest pain and leg swelling.   Gastrointestinal:  Negative for abdominal pain, constipation, diarrhea, nausea and vomiting.   Endocrine: Negative for cold intolerance, heat intolerance, polydipsia, polyphagia and polyuria.   Musculoskeletal:  Negative for arthralgias, myalgias and neck pain.   Skin:  Negative for rash and wound.   Neurological:  Positive for syncope. Negative for dizziness, speech difficulty, weakness, light-headedness and headaches.   Hematological:  Negative for adenopathy. Does not bruise/bleed easily.   Psychiatric/Behavioral:  Negative for confusion, dysphoric mood and sleep disturbance. The patient is not nervous/anxious.    All other systems reviewed and are negative.    /85 (BP Location: Left arm, Patient Position: Sitting, Cuff Size: Adult)   Pulse 83   Ht 182.9 cm (72\")   Wt 72.7 kg (160 lb 3.2 oz)   SpO2 98%   BMI 21.73 kg/m²     Vitals:    10/31/23 1119 10/31/23 1202 10/31/23 1203 10/31/23 1204   Orthostatic BP:  131/82 127/91 132/83   Orthostatic Pulse:  76 82 75   Patient " Position: Sitting Sitting Standing Lying     Objective   Allergies   Allergen Reactions    Bee Venom Swelling     States yellow jackets       Physical Exam  Vitals reviewed.   Constitutional:       Appearance: Normal appearance. He is well-developed.   HENT:      Head: Normocephalic.   Eyes:      Conjunctiva/sclera: Conjunctivae normal.   Neck:      Thyroid: No thyromegaly.      Vascular: No carotid bruit or JVD.   Cardiovascular:      Rate and Rhythm: Normal rate and regular rhythm.   Pulmonary:      Effort: Pulmonary effort is normal.      Breath sounds: Normal breath sounds.   Musculoskeletal:      Cervical back: Neck supple.      Right lower leg: No edema.      Left lower leg: No edema.   Skin:     General: Skin is warm and dry.   Neurological:      Mental Status: He is alert and oriented to person, place, and time.   Psychiatric:         Attention and Perception: Attention normal.         Mood and Affect: Mood normal.         Speech: Speech normal.         Behavior: Behavior normal. Behavior is cooperative.         Cognition and Memory: Cognition normal.         LABS  WBC   Date Value Ref Range Status   10/24/2023 10.92 (H) 3.40 - 10.80 10*3/mm3 Final     RBC   Date Value Ref Range Status   10/24/2023 4.18 4.14 - 5.80 10*6/mm3 Final     Hemoglobin   Date Value Ref Range Status   10/24/2023 13.2 13.0 - 17.7 g/dL Final     Hematocrit   Date Value Ref Range Status   10/24/2023 39.2 37.5 - 51.0 % Final     MCV   Date Value Ref Range Status   10/24/2023 93.8 79.0 - 97.0 fL Final     MCH   Date Value Ref Range Status   10/24/2023 31.6 26.6 - 33.0 pg Final     MCHC   Date Value Ref Range Status   10/24/2023 33.7 31.5 - 35.7 g/dL Final     RDW   Date Value Ref Range Status   10/24/2023 12.0 (L) 12.3 - 15.4 % Final     RDW-SD   Date Value Ref Range Status   10/24/2023 41.8 37.0 - 54.0 fl Final     MPV   Date Value Ref Range Status   10/24/2023 11.3 6.0 - 12.0 fL Final     Platelets   Date Value Ref Range Status    10/24/2023 186 140 - 450 10*3/mm3 Final     Neutrophil %   Date Value Ref Range Status   10/24/2023 72.4 42.7 - 76.0 % Final     Lymphocyte %   Date Value Ref Range Status   10/24/2023 16.3 (L) 19.6 - 45.3 % Final     Monocyte %   Date Value Ref Range Status   10/24/2023 7.0 5.0 - 12.0 % Final     Eosinophil %   Date Value Ref Range Status   10/24/2023 3.0 0.3 - 6.2 % Final     Basophil %   Date Value Ref Range Status   10/24/2023 0.8 0.0 - 1.5 % Final     Immature Grans %   Date Value Ref Range Status   10/24/2023 0.5 0.0 - 0.5 % Final     Neutrophils, Absolute   Date Value Ref Range Status   10/24/2023 7.90 (H) 1.70 - 7.00 10*3/mm3 Final     Lymphocytes, Absolute   Date Value Ref Range Status   10/24/2023 1.78 0.70 - 3.10 10*3/mm3 Final     Monocytes, Absolute   Date Value Ref Range Status   10/24/2023 0.76 0.10 - 0.90 10*3/mm3 Final     Eosinophils, Absolute   Date Value Ref Range Status   10/24/2023 0.33 0.00 - 0.40 10*3/mm3 Final     Basophils, Absolute   Date Value Ref Range Status   10/24/2023 0.09 0.00 - 0.20 10*3/mm3 Final     Immature Grans, Absolute   Date Value Ref Range Status   10/24/2023 0.06 (H) 0.00 - 0.05 10*3/mm3 Final     nRBC   Date Value Ref Range Status   10/24/2023 0.0 0.0 - 0.2 /100 WBC Final     IMAGING   CT Cervical Spine Without Contrast    Result Date: 10/24/2023   1. No acute bony abnormality.  2. Other incidental findings as above  This report was finalized on 10/24/2023 5:07 PM by Dr. Carlo Washington MD.      CT Head Without Contrast    Result Date: 10/24/2023    Unremarkable exam demonstrating no CT evidence of acute intracranial findings.  This report was finalized on 10/24/2023 5:06 PM by Dr. Carlo Washington MD.      EEG    Result Date: 8/16/2023  Normal study This report is transcribed using the Dragon dictation system.      EEG    Result Date: 8/9/2023  Normal study This report is transcribed using the Dragon dictation system.      MRI Brain With & Without Contrast    Result Date:  7/5/2023  1.  No pathologic contrast enhancement along the visualized 8th cranial nerve pathways. 2.  No enhancing mass lesions in the CP angle or IAC regions. 3. No acute intracranial findings. Otherwise unremarkable exam.  This report was finalized on 7/5/2023 11:38 AM by Dr. Higinio Quinonez MD.              Assessment & Plan   Diagnoses and all orders for this visit:    1. Tachycardia (Primary)  Continue on Lopressor half tab twice a day, continue to closely monitor heart rates  Holter monitor with results pending  Recommend avoidance of caffeine/stimulants    2. Hyperlipidemia, unspecified hyperlipidemia type  Continue on statin, heart healthy diet, LDL goal less than 70    3. Essential hypertension  Controlled continue on Norvasc as needed.    4. Multiple risk factors for coronary artery disease    5. Tobacco use  Recommend avoidance of tobacco use    6. Type 2 diabetes mellitus without complication, without long-term current use of insulin  Recommend tight glycemic control for overall health benefit    7. Syncope and collapse  Patient has completed cardiac work-up including nuclear stress test, echocardiogram,carotid ultrasound, holter monitor and tilt table. Refer to EP for second opinion to r/o POTS.  Orthostatic vitals without significant changes noted.    8. Leukocytosis, unspecified type  White blood count was recently elevated, recommend close monitoring and follow-up with primary.      Lifestyle modifications including heart healthy diet, regular exercise, maintenance of several body weight and avoidance of tobacco products     Advised if new or worsening symptoms while awaiting work-up, go to the ER.  Expresses understanding    follow-up in 3 months, sooner if needed.

## 2023-10-31 NOTE — LETTER
November 14, 2023     SHAUN Dumont  [address] [place] [address]    Patient: Serge Chandra   YOB: 1970   Date of Visit: 10/31/2023       Dear SHAUN Dumont    Serge Chandra was in my office today. Below is a copy of my note.    If you have questions, please do not hesitate to call me. I look forward to following Serge along with you.         Sincerely,        SHAUN Murillo        CC: No Recipients    Subjective    Serge Chandra is a 53 y.o. male.   Chief Complaint   Patient presents with   • Follow-up     ED Follow up       History of Present Illness   Serge Chandra is a 53-year-old male who presents to the clinic today for cardiology follow-up.  He is accompanied by his wife, Dyan.     Sinus tachycardia recently changed long-acting Toprol to short acting due to persistently elevated heart rate.  He reports heart rates have been stable and denies any worsening tachycardia or bradycardia (no log available for review today) He feels he was doing well and stable with 1/2 pill twice a day but when he tried to increase it to 1 pill twice a day he started to have side effects and has recently had another syncopal episode. He reports he has seen both ENT and neurology for complete work-up of the syncopal episodes with all testing normal per patient report, medical records are unavailable for review.  This recent syncopal episode was the first episode in several months. He was seen at [facility] for this episode.  He does report he may have taken double doses of his medicines the am of his event, however he can't recall.      Hypertension currently on Norvasc 2.5 mg daily, he only takes if BP is > 140. Home readings are stable, he denies hypotension.  He denies chest pain or dyspnea.  He denies any need to utilize Norvasc recently as his blood pressure has been well controlled generally around 120 over 70s to 80s.     Hyperlipidemia on Lipitor, he reports compliance  with medication and denies medicine side effects.  He was due to have labs prior to today's appointment however he was unable to have this done. He continues to smoke.       Underlying DM currently treated by PCP on oral medications.      He reports history of anemia and is currently on iron supplements, managed by PCP.    Patient Active Problem List   Diagnosis   • Essential hypertension   • Hyperlipidemia   • Type 2 diabetes mellitus without complication, without long-term current use of insulin   • Tobacco use   • Multiple risk factors for coronary artery disease   • Syncope and collapse   • Fracture of rib of right side with delayed healing     Past Medical History:   Diagnosis Date   • Diabetes mellitus 07/2010   • GERD (gastroesophageal reflux disease)    • Hyperlipidemia 10/27/2022   • Hypertension 09/2021   • Kidney stone      Past Surgical History:   Procedure Laterality Date   • KIDNEY SURGERY         Family History   Problem Relation Age of Onset   • COPD Mother    • Lung cancer Mother    • Arthritis Mother    • Diabetes Father    • Stroke Father    • Parkinsonism Father    • Hypertension Brother    • Hypertension Brother    • Heart attack Maternal Grandfather    • Stroke Paternal Grandmother      Social History     Tobacco Use   • Smoking status: Every Day     Packs/day: 0.50     Years: 30.00     Additional pack years: 0.00     Total pack years: 15.00     Types: Cigarettes   • Smokeless tobacco: Never   Vaping Use   • Vaping Use: Never used   Substance Use Topics   • Alcohol use: Not Currently   • Drug use: Not Currently     The following portions of the patient's history were reviewed and updated as appropriate: allergies, current medications, past family history, past medical history, past social history, past surgical history and problem list.    Allergies   Allergen Reactions   • Bee Venom Swelling     States yellow jackets         Current Outpatient Medications:   •  amLODIPine (NORVASC) 2.5 MG  tablet, Take 1 tablet by mouth Daily. Take if BP > 140, Disp: 30 tablet, Rfl:   •  atorvastatin (LIPITOR) 20 MG tablet, Take 1 tablet by mouth Daily., Disp: 90 tablet, Rfl: 1  •  ferrous sulfate 324 (65 Fe) MG tablet delayed-release EC tablet, Take 1 tablet by mouth Daily With Breakfast., Disp: , Rfl:   •  gabapentin (NEURONTIN) 300 MG capsule, Take 1 capsule by mouth 2 (Two) Times a Day., Disp: , Rfl:   •  glyburide (DIAbeta) 5 MG tablet, Take 2 tablets by mouth 2 (Two) Times a Day., Disp: , Rfl:   •  metFORMIN (GLUCOPHAGE) 1000 MG tablet, Take 1 tablet by mouth 2 (Two) Times a Day., Disp: , Rfl:   •  metoprolol tartrate (LOPRESSOR) 25 MG tablet, Take 0.5 tablets by mouth 2 (Two) Times a Day., Disp: 60 tablet, Rfl: 3  •  ofloxacin (FLOXIN) 0.3 % otic solution, As Needed., Disp: , Rfl:   •  pantoprazole (PROTONIX) 40 MG EC tablet, Take 1 tablet by mouth Daily., Disp: , Rfl:     Review of Systems   Constitutional:  Negative for activity change, appetite change, chills, diaphoresis, fatigue and fever.   HENT:  Negative for congestion, drooling, ear discharge, ear pain, mouth sores, nosebleeds, postnasal drip, rhinorrhea, sinus pressure, sneezing and sore throat.    Eyes:  Negative for pain, discharge and visual disturbance.   Respiratory:  Negative for cough, chest tightness, shortness of breath and wheezing.    Cardiovascular:  Positive for palpitations (tachycardia). Negative for chest pain and leg swelling.   Gastrointestinal:  Negative for abdominal pain, constipation, diarrhea, nausea and vomiting.   Endocrine: Negative for cold intolerance, heat intolerance, polydipsia, polyphagia and polyuria.   Musculoskeletal:  Negative for arthralgias, myalgias and neck pain.   Skin:  Negative for rash and wound.   Neurological:  Positive for syncope. Negative for dizziness, speech difficulty, weakness, light-headedness and headaches.   Hematological:  Negative for adenopathy. Does not bruise/bleed easily.  "  Psychiatric/Behavioral:  Negative for confusion, dysphoric mood and sleep disturbance. The patient is not nervous/anxious.    All other systems reviewed and are negative.    /85 (BP Location: Left arm, Patient Position: Sitting, Cuff Size: Adult)   Pulse 83   Ht 182.9 cm (72\")   Wt 72.7 kg (160 lb 3.2 oz)   SpO2 98%   BMI 21.73 kg/m²     Vitals:    10/31/23 1119 10/31/23 1202 10/31/23 1203 10/31/23 1204   Orthostatic BP:  131/82 127/91 132/83   Orthostatic Pulse:  76 82 75   Patient Position: Sitting Sitting Standing Lying     Objective  Allergies   Allergen Reactions   • Bee Venom Swelling     States yellow jackets       Physical Exam  Vitals reviewed.   Constitutional:       Appearance: Normal appearance. He is well-developed.   HENT:      Head: Normocephalic.   Eyes:      Conjunctiva/sclera: Conjunctivae normal.   Neck:      Thyroid: No thyromegaly.      Vascular: No carotid bruit or JVD.   Cardiovascular:      Rate and Rhythm: Normal rate and regular rhythm.   Pulmonary:      Effort: Pulmonary effort is normal.      Breath sounds: Normal breath sounds.   Musculoskeletal:      Cervical back: Neck supple.      Right lower leg: No edema.      Left lower leg: No edema.   Skin:     General: Skin is warm and dry.   Neurological:      Mental Status: He is alert and oriented to person, place, and time.   Psychiatric:         Attention and Perception: Attention normal.         Mood and Affect: Mood normal.         Speech: Speech normal.         Behavior: Behavior normal. Behavior is cooperative.         Cognition and Memory: Cognition normal.         LABS  WBC   Date Value Ref Range Status   10/24/2023 10.92 (H) 3.40 - 10.80 10*3/mm3 Final     RBC   Date Value Ref Range Status   10/24/2023 4.18 4.14 - 5.80 10*6/mm3 Final     Hemoglobin   Date Value Ref Range Status   10/24/2023 13.2 13.0 - 17.7 g/dL Final     Hematocrit   Date Value Ref Range Status   10/24/2023 39.2 37.5 - 51.0 % Final     MCV   Date Value " Ref Range Status   10/24/2023 93.8 79.0 - 97.0 fL Final     MCH   Date Value Ref Range Status   10/24/2023 31.6 26.6 - 33.0 pg Final     MCHC   Date Value Ref Range Status   10/24/2023 33.7 31.5 - 35.7 g/dL Final     RDW   Date Value Ref Range Status   10/24/2023 12.0 (L) 12.3 - 15.4 % Final     RDW-SD   Date Value Ref Range Status   10/24/2023 41.8 37.0 - 54.0 fl Final     MPV   Date Value Ref Range Status   10/24/2023 11.3 6.0 - 12.0 fL Final     Platelets   Date Value Ref Range Status   10/24/2023 186 140 - 450 10*3/mm3 Final     Neutrophil %   Date Value Ref Range Status   10/24/2023 72.4 42.7 - 76.0 % Final     Lymphocyte %   Date Value Ref Range Status   10/24/2023 16.3 (L) 19.6 - 45.3 % Final     Monocyte %   Date Value Ref Range Status   10/24/2023 7.0 5.0 - 12.0 % Final     Eosinophil %   Date Value Ref Range Status   10/24/2023 3.0 0.3 - 6.2 % Final     Basophil %   Date Value Ref Range Status   10/24/2023 0.8 0.0 - 1.5 % Final     Immature Grans %   Date Value Ref Range Status   10/24/2023 0.5 0.0 - 0.5 % Final     Neutrophils, Absolute   Date Value Ref Range Status   10/24/2023 7.90 (H) 1.70 - 7.00 10*3/mm3 Final     Lymphocytes, Absolute   Date Value Ref Range Status   10/24/2023 1.78 0.70 - 3.10 10*3/mm3 Final     Monocytes, Absolute   Date Value Ref Range Status   10/24/2023 0.76 0.10 - 0.90 10*3/mm3 Final     Eosinophils, Absolute   Date Value Ref Range Status   10/24/2023 0.33 0.00 - 0.40 10*3/mm3 Final     Basophils, Absolute   Date Value Ref Range Status   10/24/2023 0.09 0.00 - 0.20 10*3/mm3 Final     Immature Grans, Absolute   Date Value Ref Range Status   10/24/2023 0.06 (H) 0.00 - 0.05 10*3/mm3 Final     nRBC   Date Value Ref Range Status   10/24/2023 0.0 0.0 - 0.2 /100 WBC Final     IMAGING   CT Cervical Spine Without Contrast    Result Date: 10/24/2023   1. No acute bony abnormality.  2. Other incidental findings as above  This report was finalized on 10/24/2023 5:07 PM by Dr. Carlo Washington,  MD.      CT Head Without Contrast    Result Date: 10/24/2023    Unremarkable exam demonstrating no CT evidence of acute intracranial findings.  This report was finalized on 10/24/2023 5:06 PM by Dr. Carlo Washington MD.      EEG    Result Date: 8/16/2023  Normal study This report is transcribed using the Dragon dictation system.      EEG    Result Date: 8/9/2023  Normal study This report is transcribed using the Dragon dictation system.      MRI Brain With & Without Contrast    Result Date: 7/5/2023  1.  No pathologic contrast enhancement along the visualized 8th cranial nerve pathways. 2.  No enhancing mass lesions in the CP angle or IAC regions. 3. No acute intracranial findings. Otherwise unremarkable exam.  This report was finalized on 7/5/2023 11:38 AM by Dr. Higinio Quinonez MD.              Assessment & Plan  Diagnoses and all orders for this visit:    1. Tachycardia (Primary)  Continue on Lopressor half tab twice a day, continue to closely monitor heart rates  Holter monitor with results pending  Recommend avoidance of caffeine/stimulants    2. Hyperlipidemia, unspecified hyperlipidemia type  Continue on statin, heart healthy diet, LDL goal less than 70    3. Essential hypertension  Controlled continue on Norvasc as needed.    4. Multiple risk factors for coronary artery disease    5. Tobacco use  Recommend avoidance of tobacco use    6. Type 2 diabetes mellitus without complication, without long-term current use of insulin  Recommend tight glycemic control for overall health benefit    7. Syncope and collapse  Patient has completed cardiac work-up including nuclear stress test, echocardiogram,carotid ultrasound, holter monitor and tilt table. Refer to EP for second opinion to r/o POTS.  Orthostatic vitals without significant changes noted.    8. Leukocytosis, unspecified type  White blood count was recently elevated, recommend close monitoring and follow-up with primary.      Lifestyle modifications including  heart healthy diet, regular exercise, maintenance of several body weight and avoidance of tobacco products     Advised if new or worsening symptoms while awaiting work-up, go to the ER.  Expresses understanding    follow-up in 3 months, sooner if needed.

## 2023-11-16 ENCOUNTER — TELEPHONE (OUTPATIENT)
Dept: CARDIOLOGY | Facility: CLINIC | Age: 53
End: 2023-11-16
Payer: COMMERCIAL

## 2023-11-16 NOTE — TELEPHONE ENCOUNTER
Called pt and informed his wife of the following per Nayana Franklin APRN   Holter monitor without significant arrhythmia. Min HR 60, average 88 and fastest 170. Occ PAC. No a fib or pauses or atrial tachycardia. Continue on lopressor and await EP evaluation.

## 2023-11-16 NOTE — TELEPHONE ENCOUNTER
----- Message from SHAUN Sandoval sent at 11/14/2023  6:53 PM EST -----  Holter monitor without significant arrhythmia. Min HR 60, average 88 and fastest 170. Occ PAC. No a fib or pauses or atrial tachycardia. Continue on lopressor and await EP evaluation.

## 2023-11-22 ENCOUNTER — TELEPHONE (OUTPATIENT)
Dept: CARDIOLOGY | Facility: CLINIC | Age: 53
End: 2023-11-22
Payer: COMMERCIAL

## 2023-11-22 NOTE — TELEPHONE ENCOUNTER
Caller: Serge Chandra    Relationship to patient: Self    Best call back number: [phone]    Chief complaint: PT NEEDS TO RESCHEDULE HIS APPOINTMENT FOR 12.29.23. NO AVAILABILITY WITHIN HUBS TIMEFRAME. PLEASE REACH OUT TO PT TO RESCHEDULE.    Type of visit: NEW PT    Requested date: ANY DAY AS LATE IN THE DAY AS POSSIBLE    If rescheduling, when is the original appointment: 12.29.23

## 2024-01-25 ENCOUNTER — LAB (OUTPATIENT)
Dept: LAB | Facility: HOSPITAL | Age: 54
End: 2024-01-25
Payer: COMMERCIAL

## 2024-01-25 DIAGNOSIS — E78.5 HYPERLIPIDEMIA, UNSPECIFIED HYPERLIPIDEMIA TYPE: ICD-10-CM

## 2024-01-25 PROCEDURE — 80061 LIPID PANEL: CPT

## 2024-01-25 PROCEDURE — 82550 ASSAY OF CK (CPK): CPT

## 2024-01-25 PROCEDURE — 80053 COMPREHEN METABOLIC PANEL: CPT

## 2024-01-26 LAB
ALBUMIN SERPL-MCNC: 4.2 G/DL (ref 3.5–5.2)
ALBUMIN/GLOB SERPL: 1.7 G/DL
ALP SERPL-CCNC: 68 U/L (ref 39–117)
ALT SERPL W P-5'-P-CCNC: 16 U/L (ref 1–41)
ANION GAP SERPL CALCULATED.3IONS-SCNC: 12.7 MMOL/L (ref 5–15)
AST SERPL-CCNC: 15 U/L (ref 1–40)
BILIRUB SERPL-MCNC: 0.3 MG/DL (ref 0–1.2)
BUN SERPL-MCNC: 17 MG/DL (ref 6–20)
BUN/CREAT SERPL: 19.1 (ref 7–25)
CALCIUM SPEC-SCNC: 8.8 MG/DL (ref 8.6–10.5)
CHLORIDE SERPL-SCNC: 105 MMOL/L (ref 98–107)
CHOLEST SERPL-MCNC: 104 MG/DL (ref 0–200)
CK SERPL-CCNC: 70 U/L (ref 20–200)
CO2 SERPL-SCNC: 20.3 MMOL/L (ref 22–29)
CREAT SERPL-MCNC: 0.89 MG/DL (ref 0.76–1.27)
EGFRCR SERPLBLD CKD-EPI 2021: 101.8 ML/MIN/1.73
GLOBULIN UR ELPH-MCNC: 2.5 GM/DL
GLUCOSE SERPL-MCNC: 168 MG/DL (ref 65–99)
HDLC SERPL-MCNC: 48 MG/DL (ref 40–60)
LDLC SERPL CALC-MCNC: 40 MG/DL (ref 0–100)
LDLC/HDLC SERPL: 0.84 {RATIO}
POTASSIUM SERPL-SCNC: 4.9 MMOL/L (ref 3.5–5.2)
PROT SERPL-MCNC: 6.7 G/DL (ref 6–8.5)
SODIUM SERPL-SCNC: 138 MMOL/L (ref 136–145)
TRIGL SERPL-MCNC: 79 MG/DL (ref 0–150)
VLDLC SERPL-MCNC: 16 MG/DL (ref 5–40)

## 2024-01-30 ENCOUNTER — OFFICE VISIT (OUTPATIENT)
Dept: CARDIOLOGY | Facility: CLINIC | Age: 54
End: 2024-01-30
Payer: COMMERCIAL

## 2024-01-30 VITALS
BODY MASS INDEX: 22.21 KG/M2 | WEIGHT: 164 LBS | SYSTOLIC BLOOD PRESSURE: 126 MMHG | DIASTOLIC BLOOD PRESSURE: 76 MMHG | HEART RATE: 72 BPM | OXYGEN SATURATION: 99 % | HEIGHT: 72 IN

## 2024-01-30 DIAGNOSIS — I10 ESSENTIAL HYPERTENSION: ICD-10-CM

## 2024-01-30 DIAGNOSIS — R00.0 TACHYCARDIA: Primary | ICD-10-CM

## 2024-01-30 DIAGNOSIS — E11.9 TYPE 2 DIABETES MELLITUS WITHOUT COMPLICATION, WITHOUT LONG-TERM CURRENT USE OF INSULIN: ICD-10-CM

## 2024-01-30 DIAGNOSIS — E78.5 HYPERLIPIDEMIA, UNSPECIFIED HYPERLIPIDEMIA TYPE: ICD-10-CM

## 2024-01-30 DIAGNOSIS — Z72.0 TOBACCO USE: ICD-10-CM

## 2024-01-30 DIAGNOSIS — Z91.89 MULTIPLE RISK FACTORS FOR CORONARY ARTERY DISEASE: ICD-10-CM

## 2024-01-30 DIAGNOSIS — Z87.898 HISTORY OF SYNCOPE: ICD-10-CM

## 2024-01-30 RX ORDER — LEVETIRACETAM 500 MG/1
500 TABLET ORAL DAILY
COMMUNITY
Start: 2023-12-17

## 2024-01-30 RX ORDER — ATORVASTATIN CALCIUM 20 MG/1
20 TABLET, FILM COATED ORAL DAILY
Qty: 90 TABLET | Refills: 1 | Status: SHIPPED | OUTPATIENT
Start: 2024-01-30

## 2024-01-30 NOTE — LETTER
February 4, 2024     SHAUN Dumont  [address] [facility] [address]    Patient: Serge Chandra   YOB: 1970   Date of Visit: 1/30/2024       Dear SHAUN Dumont    Serge Chandra was in my office today. Below is a copy of my note.    If you have questions, please do not hesitate to call me. I look forward to following Serge along with you.         Sincerely,        SHAUN Murillo        CC: No Recipients    Subjective    Serge Chandra is a 53 y.o. male.   Chief Complaint   Patient presents with   • Results     labs   • Rapid Heart Rate     Follow up     History of Present Illness   Serge Chandra is a 53-year-old male who presents to clinic today for cardiology follow-up.  He is accompanied by his wife, Dyan.    Sinus tachycardia currently on Lopressor 25 mg twice daily.  He report heart rate seems to be controlled.  He reports heart rate averages in the 80s.  He denies worsening tachycardia, bradycardia, palpitations, dizziness or syncope.  He has had intermittent syncopal episodes however the last episode was around the time of his last visit.  Had recommended he see EP for further evaluation but they canceled those appointments as he has been doing well recently.  He reports he has seen both ENT and neurology for complete work-up of the syncopal episodes with all testing normal per patient report, medical records are unavailable for review.  Recently his wife witnessed an episode and felt it was more neurological/seizure related.  He has been placed on Keppra and they feel he is doing well since this time and denies any further episodes.       Hypertension currently on Norvasc 2.5 mg daily, he only takes if BP is > 140. He takes lopressor twice daily. Home readings are stable, he denies hypotension.  He denies chest pain or dyspnea.  He denies any need to utilize Norvasc recently as his blood pressure has been well controlled generally around 120 over 70s to 80s.      Hyperlipidemia on Lipitor, he reports compliance with medication and denies medicine side effects.  He had labs prior to today's appointment which he would like to discuss. He continues to smoke.        Underlying DM currently treated by PCP on oral medications.      He reports history of anemia and is currently on iron supplements, managed by PCP.      Patient Active Problem List   Diagnosis   • Essential hypertension   • Hyperlipidemia   • Type 2 diabetes mellitus without complication, without long-term current use of insulin   • Tobacco use   • Multiple risk factors for coronary artery disease   • Syncope and collapse   • Fracture of rib of right side with delayed healing     Past Medical History:   Diagnosis Date   • Diabetes mellitus 07/2010   • GERD (gastroesophageal reflux disease)    • Hyperlipidemia 10/27/2022   • Hypertension 09/2021   • Kidney stone      Past Surgical History:   Procedure Laterality Date   • KIDNEY SURGERY       Family History   Problem Relation Age of Onset   • COPD Mother    • Lung cancer Mother    • Arthritis Mother    • Diabetes Father    • Stroke Father    • Parkinsonism Father    • Hypertension Brother    • Hypertension Brother    • Heart attack Maternal Grandfather    • Stroke Paternal Grandmother      Social History     Tobacco Use   • Smoking status: Every Day     Packs/day: 0.50     Years: 30.00     Additional pack years: 0.00     Total pack years: 15.00     Types: Cigarettes   • Smokeless tobacco: Never   Vaping Use   • Vaping Use: Never used   Substance Use Topics   • Alcohol use: Not Currently   • Drug use: Not Currently         The following portions of the patient's history were reviewed and updated as appropriate: allergies, current medications, past family history, past medical history, past social history, past surgical history and problem list.    Allergies   Allergen Reactions   • Bee Venom Swelling     States yellow jackets         Current Outpatient Medications:    •  atorvastatin (LIPITOR) 20 MG tablet, Take 1 tablet by mouth Daily., Disp: 90 tablet, Rfl: 1  •  ferrous sulfate 324 (65 Fe) MG tablet delayed-release EC tablet, Take 1 tablet by mouth Daily With Breakfast., Disp: , Rfl:   •  gabapentin (NEURONTIN) 300 MG capsule, Take 1 capsule by mouth 2 (Two) Times a Day., Disp: , Rfl:   •  glyburide (DIAbeta) 5 MG tablet, Take 2 tablets by mouth 2 (Two) Times a Day., Disp: , Rfl:   •  levETIRAcetam (KEPPRA) 500 MG tablet, Take 1 tablet by mouth Daily., Disp: , Rfl:   •  metFORMIN (GLUCOPHAGE) 1000 MG tablet, Take 1 tablet by mouth 2 (Two) Times a Day., Disp: , Rfl:   •  metoprolol tartrate (LOPRESSOR) 25 MG tablet, Take 1 tablet by mouth 2 (Two) Times a Day., Disp: 180 tablet, Rfl: 1  •  pantoprazole (PROTONIX) 40 MG EC tablet, Take 1 tablet by mouth Daily., Disp: , Rfl:   •  ofloxacin (FLOXIN) 0.3 % otic solution, As Needed. (Patient not taking: Reported on 1/30/2024), Disp: , Rfl:     Review of Systems   Constitutional:  Negative for activity change, appetite change, chills, diaphoresis, fatigue and fever.   HENT:  Negative for congestion, drooling, ear discharge, ear pain, mouth sores, nosebleeds, postnasal drip, rhinorrhea, sinus pressure, sneezing and sore throat.    Eyes:  Negative for pain, discharge and visual disturbance.   Respiratory:  Negative for cough, chest tightness, shortness of breath and wheezing.    Cardiovascular:  Negative for chest pain, palpitations and leg swelling.   Gastrointestinal:  Negative for abdominal pain, constipation, diarrhea, nausea and vomiting.   Endocrine: Negative for cold intolerance, heat intolerance, polydipsia, polyphagia and polyuria.   Musculoskeletal:  Negative for arthralgias, myalgias and neck pain.   Skin:  Negative for rash and wound.   Neurological:  Negative for dizziness, syncope, speech difficulty, weakness, light-headedness and headaches.   Hematological:  Negative for adenopathy. Does not bruise/bleed easily.  "  Psychiatric/Behavioral:  Negative for confusion, dysphoric mood and sleep disturbance. The patient is not nervous/anxious.    All other systems reviewed and are negative.    /76 (BP Location: Left arm, Patient Position: Sitting, Cuff Size: Adult)   Pulse 72   Ht 182.9 cm (72\")   Wt 74.4 kg (164 lb)   SpO2 99%   BMI 22.24 kg/m²     Objective  Allergies   Allergen Reactions   • Bee Venom Swelling     States yellow jackets       Physical Exam  Vitals reviewed.   Constitutional:       Appearance: Normal appearance. He is well-developed.   HENT:      Head: Normocephalic.   Eyes:      Conjunctiva/sclera: Conjunctivae normal.   Neck:      Thyroid: No thyromegaly.      Vascular: No carotid bruit or JVD.   Cardiovascular:      Rate and Rhythm: Normal rate and regular rhythm.   Pulmonary:      Effort: Pulmonary effort is normal.      Breath sounds: Normal breath sounds.   Musculoskeletal:      Cervical back: Neck supple.      Right lower leg: No edema.      Left lower leg: No edema.   Skin:     General: Skin is warm and dry.   Neurological:      Mental Status: He is alert and oriented to person, place, and time.   Psychiatric:         Attention and Perception: Attention normal.         Mood and Affect: Mood normal.         Speech: Speech normal.         Behavior: Behavior normal. Behavior is cooperative.         Cognition and Memory: Cognition normal.       LABS  WBC   Date Value Ref Range Status   10/24/2023 10.92 (H) 3.40 - 10.80 10*3/mm3 Final     RBC   Date Value Ref Range Status   10/24/2023 4.18 4.14 - 5.80 10*6/mm3 Final     Hemoglobin   Date Value Ref Range Status   10/24/2023 13.2 13.0 - 17.7 g/dL Final     Hematocrit   Date Value Ref Range Status   10/24/2023 39.2 37.5 - 51.0 % Final     MCV   Date Value Ref Range Status   10/24/2023 93.8 79.0 - 97.0 fL Final     MCH   Date Value Ref Range Status   10/24/2023 31.6 26.6 - 33.0 pg Final     MCHC   Date Value Ref Range Status   10/24/2023 33.7 31.5 - 35.7 " g/dL Final     RDW   Date Value Ref Range Status   10/24/2023 12.0 (L) 12.3 - 15.4 % Final     RDW-SD   Date Value Ref Range Status   10/24/2023 41.8 37.0 - 54.0 fl Final     MPV   Date Value Ref Range Status   10/24/2023 11.3 6.0 - 12.0 fL Final     Platelets   Date Value Ref Range Status   10/24/2023 186 140 - 450 10*3/mm3 Final     Neutrophil %   Date Value Ref Range Status   10/24/2023 72.4 42.7 - 76.0 % Final     Lymphocyte %   Date Value Ref Range Status   10/24/2023 16.3 (L) 19.6 - 45.3 % Final     Monocyte %   Date Value Ref Range Status   10/24/2023 7.0 5.0 - 12.0 % Final     Eosinophil %   Date Value Ref Range Status   10/24/2023 3.0 0.3 - 6.2 % Final     Basophil %   Date Value Ref Range Status   10/24/2023 0.8 0.0 - 1.5 % Final     Immature Grans %   Date Value Ref Range Status   10/24/2023 0.5 0.0 - 0.5 % Final     Neutrophils, Absolute   Date Value Ref Range Status   10/24/2023 7.90 (H) 1.70 - 7.00 10*3/mm3 Final     Lymphocytes, Absolute   Date Value Ref Range Status   10/24/2023 1.78 0.70 - 3.10 10*3/mm3 Final     Monocytes, Absolute   Date Value Ref Range Status   10/24/2023 0.76 0.10 - 0.90 10*3/mm3 Final     Eosinophils, Absolute   Date Value Ref Range Status   10/24/2023 0.33 0.00 - 0.40 10*3/mm3 Final     Basophils, Absolute   Date Value Ref Range Status   10/24/2023 0.09 0.00 - 0.20 10*3/mm3 Final     Immature Grans, Absolute   Date Value Ref Range Status   10/24/2023 0.06 (H) 0.00 - 0.05 10*3/mm3 Final     nRBC   Date Value Ref Range Status   10/24/2023 0.0 0.0 - 0.2 /100 WBC Final       Total Cholesterol   Date Value Ref Range Status   01/25/2024 104 0 - 200 mg/dL Final     Triglycerides   Date Value Ref Range Status   01/25/2024 79 0 - 150 mg/dL Final     HDL Cholesterol   Date Value Ref Range Status   01/25/2024 48 40 - 60 mg/dL Final     LDL Cholesterol    Date Value Ref Range Status   01/25/2024 40 0 - 100 mg/dL Final     IMAGING  CT Cervical Spine Without Contrast    Result Date:  10/24/2023   1. No acute bony abnormality.  2. Other incidental findings as above  This report was finalized on 10/24/2023 5:07 PM by Dr. Carlo Washington MD.      CT Head Without Contrast    Result Date: 10/24/2023    Unremarkable exam demonstrating no CT evidence of acute intracranial findings.  This report was finalized on 10/24/2023 5:06 PM by Dr. Carlo Washington MD.              Assessment & Plan  Diagnoses and all orders for this visit:    1. Tachycardia (Primary)  Heart rate stable and controlled on Lopressor, will plan to continue  Recommend avoidance of caffeine/stimulants    2. Essential hypertension  Controlled, continue current therapy, continue routine monitoring  Sodium restrictions recommended    3. Hyperlipidemia, unspecified hyperlipidemia type  Discussed and reviewed recent labs, LDL is at goal of 40, will plan to continue statin, heart healthy diet    4. Multiple risk factors for coronary artery disease  Clinically asymptomatic, will continue to monitor symptoms and proceed with workup accordingly    5. Type 2 diabetes mellitus without complication, without long-term current use of insulin  Recommend tight glycemic control for overall health benefit, management by primary    6. Tobacco use  Recommend avoidance of tobacco products     7. History of syncope  Currently asymptomatic without further episodes since initiating Keppra, will continue to monitor, he is following with primary and specialty care - ENT and neurology.    Other orders  -     metoprolol tartrate (LOPRESSOR) 25 MG tablet; Take 1 tablet by mouth 2 (Two) Times a Day.  Dispense: 180 tablet; Refill: 1  -     atorvastatin (LIPITOR) 20 MG tablet; Take 1 tablet by mouth Daily.  Dispense: 90 tablet; Refill: 1    Lifestyle modifications including heart healthy diet, regular exercise, maintenance of desirable body weight and avoidance of tobacco products    Follow-up in 6 months, sooner if needed

## 2024-01-30 NOTE — PROGRESS NOTES
Subjective     Serge Chandra is a 53 y.o. male.   Chief Complaint   Patient presents with    Results     labs    Rapid Heart Rate     Follow up     History of Present Illness   Serge Chandra is a 53-year-old male who presents to clinic today for cardiology follow-up.  He is accompanied by his wife, Dyan.    Sinus tachycardia currently on Lopressor 25 mg twice daily.  He report heart rate seems to be controlled.  He reports heart rate averages in the 80s.  He denies worsening tachycardia, bradycardia, palpitations, dizziness or syncope.  He has had intermittent syncopal episodes however the last episode was around the time of his last visit.  Had recommended he see EP for further evaluation but they canceled those appointments as he has been doing well recently.  He reports he has seen both ENT and neurology for complete work-up of the syncopal episodes with all testing normal per patient report, medical records are unavailable for review.  Recently his wife witnessed an episode and felt it was more neurological/seizure related.  He has been placed on Keppra and they feel he is doing well since this time and denies any further episodes.       Hypertension currently on Norvasc 2.5 mg daily, he only takes if BP is > 140. He takes lopressor twice daily. Home readings are stable, he denies hypotension.  He denies chest pain or dyspnea.  He denies any need to utilize Norvasc recently as his blood pressure has been well controlled generally around 120 over 70s to 80s.     Hyperlipidemia on Lipitor, he reports compliance with medication and denies medicine side effects.  He had labs prior to today's appointment which he would like to discuss. He continues to smoke.        Underlying DM currently treated by PCP on oral medications.      He reports history of anemia and is currently on iron supplements, managed by PCP.      Patient Active Problem List   Diagnosis    Essential hypertension    Hyperlipidemia    Type 2  diabetes mellitus without complication, without long-term current use of insulin    Tobacco use    Multiple risk factors for coronary artery disease    Syncope and collapse    Fracture of rib of right side with delayed healing     Past Medical History:   Diagnosis Date    Diabetes mellitus 07/2010    GERD (gastroesophageal reflux disease)     Hyperlipidemia 10/27/2022    Hypertension 09/2021    Kidney stone      Past Surgical History:   Procedure Laterality Date    KIDNEY SURGERY       Family History   Problem Relation Age of Onset    COPD Mother     Lung cancer Mother     Arthritis Mother     Diabetes Father     Stroke Father     Parkinsonism Father     Hypertension Brother     Hypertension Brother     Heart attack Maternal Grandfather     Stroke Paternal Grandmother      Social History     Tobacco Use    Smoking status: Every Day     Packs/day: 0.50     Years: 30.00     Additional pack years: 0.00     Total pack years: 15.00     Types: Cigarettes    Smokeless tobacco: Never   Vaping Use    Vaping Use: Never used   Substance Use Topics    Alcohol use: Not Currently    Drug use: Not Currently         The following portions of the patient's history were reviewed and updated as appropriate: allergies, current medications, past family history, past medical history, past social history, past surgical history and problem list.    Allergies   Allergen Reactions    Bee Venom Swelling     States yellow jackets         Current Outpatient Medications:     atorvastatin (LIPITOR) 20 MG tablet, Take 1 tablet by mouth Daily., Disp: 90 tablet, Rfl: 1    ferrous sulfate 324 (65 Fe) MG tablet delayed-release EC tablet, Take 1 tablet by mouth Daily With Breakfast., Disp: , Rfl:     gabapentin (NEURONTIN) 300 MG capsule, Take 1 capsule by mouth 2 (Two) Times a Day., Disp: , Rfl:     glyburide (DIAbeta) 5 MG tablet, Take 2 tablets by mouth 2 (Two) Times a Day., Disp: , Rfl:     levETIRAcetam (KEPPRA) 500 MG tablet, Take 1 tablet by  "mouth Daily., Disp: , Rfl:     metFORMIN (GLUCOPHAGE) 1000 MG tablet, Take 1 tablet by mouth 2 (Two) Times a Day., Disp: , Rfl:     metoprolol tartrate (LOPRESSOR) 25 MG tablet, Take 1 tablet by mouth 2 (Two) Times a Day., Disp: 180 tablet, Rfl: 1    pantoprazole (PROTONIX) 40 MG EC tablet, Take 1 tablet by mouth Daily., Disp: , Rfl:     ofloxacin (FLOXIN) 0.3 % otic solution, As Needed. (Patient not taking: Reported on 1/30/2024), Disp: , Rfl:     Review of Systems   Constitutional:  Negative for activity change, appetite change, chills, diaphoresis, fatigue and fever.   HENT:  Negative for congestion, drooling, ear discharge, ear pain, mouth sores, nosebleeds, postnasal drip, rhinorrhea, sinus pressure, sneezing and sore throat.    Eyes:  Negative for pain, discharge and visual disturbance.   Respiratory:  Negative for cough, chest tightness, shortness of breath and wheezing.    Cardiovascular:  Negative for chest pain, palpitations and leg swelling.   Gastrointestinal:  Negative for abdominal pain, constipation, diarrhea, nausea and vomiting.   Endocrine: Negative for cold intolerance, heat intolerance, polydipsia, polyphagia and polyuria.   Musculoskeletal:  Negative for arthralgias, myalgias and neck pain.   Skin:  Negative for rash and wound.   Neurological:  Negative for dizziness, syncope, speech difficulty, weakness, light-headedness and headaches.   Hematological:  Negative for adenopathy. Does not bruise/bleed easily.   Psychiatric/Behavioral:  Negative for confusion, dysphoric mood and sleep disturbance. The patient is not nervous/anxious.    All other systems reviewed and are negative.    /76 (BP Location: Left arm, Patient Position: Sitting, Cuff Size: Adult)   Pulse 72   Ht 182.9 cm (72\")   Wt 74.4 kg (164 lb)   SpO2 99%   BMI 22.24 kg/m²     Objective   Allergies   Allergen Reactions    Bee Venom Swelling     States yellow jackets       Physical Exam  Vitals reviewed.   Constitutional:     "   Appearance: Normal appearance. He is well-developed.   HENT:      Head: Normocephalic.   Eyes:      Conjunctiva/sclera: Conjunctivae normal.   Neck:      Thyroid: No thyromegaly.      Vascular: No carotid bruit or JVD.   Cardiovascular:      Rate and Rhythm: Normal rate and regular rhythm.   Pulmonary:      Effort: Pulmonary effort is normal.      Breath sounds: Normal breath sounds.   Musculoskeletal:      Cervical back: Neck supple.      Right lower leg: No edema.      Left lower leg: No edema.   Skin:     General: Skin is warm and dry.   Neurological:      Mental Status: He is alert and oriented to person, place, and time.   Psychiatric:         Attention and Perception: Attention normal.         Mood and Affect: Mood normal.         Speech: Speech normal.         Behavior: Behavior normal. Behavior is cooperative.         Cognition and Memory: Cognition normal.       LABS  WBC   Date Value Ref Range Status   10/24/2023 10.92 (H) 3.40 - 10.80 10*3/mm3 Final     RBC   Date Value Ref Range Status   10/24/2023 4.18 4.14 - 5.80 10*6/mm3 Final     Hemoglobin   Date Value Ref Range Status   10/24/2023 13.2 13.0 - 17.7 g/dL Final     Hematocrit   Date Value Ref Range Status   10/24/2023 39.2 37.5 - 51.0 % Final     MCV   Date Value Ref Range Status   10/24/2023 93.8 79.0 - 97.0 fL Final     MCH   Date Value Ref Range Status   10/24/2023 31.6 26.6 - 33.0 pg Final     MCHC   Date Value Ref Range Status   10/24/2023 33.7 31.5 - 35.7 g/dL Final     RDW   Date Value Ref Range Status   10/24/2023 12.0 (L) 12.3 - 15.4 % Final     RDW-SD   Date Value Ref Range Status   10/24/2023 41.8 37.0 - 54.0 fl Final     MPV   Date Value Ref Range Status   10/24/2023 11.3 6.0 - 12.0 fL Final     Platelets   Date Value Ref Range Status   10/24/2023 186 140 - 450 10*3/mm3 Final     Neutrophil %   Date Value Ref Range Status   10/24/2023 72.4 42.7 - 76.0 % Final     Lymphocyte %   Date Value Ref Range Status   10/24/2023 16.3 (L) 19.6 -  45.3 % Final     Monocyte %   Date Value Ref Range Status   10/24/2023 7.0 5.0 - 12.0 % Final     Eosinophil %   Date Value Ref Range Status   10/24/2023 3.0 0.3 - 6.2 % Final     Basophil %   Date Value Ref Range Status   10/24/2023 0.8 0.0 - 1.5 % Final     Immature Grans %   Date Value Ref Range Status   10/24/2023 0.5 0.0 - 0.5 % Final     Neutrophils, Absolute   Date Value Ref Range Status   10/24/2023 7.90 (H) 1.70 - 7.00 10*3/mm3 Final     Lymphocytes, Absolute   Date Value Ref Range Status   10/24/2023 1.78 0.70 - 3.10 10*3/mm3 Final     Monocytes, Absolute   Date Value Ref Range Status   10/24/2023 0.76 0.10 - 0.90 10*3/mm3 Final     Eosinophils, Absolute   Date Value Ref Range Status   10/24/2023 0.33 0.00 - 0.40 10*3/mm3 Final     Basophils, Absolute   Date Value Ref Range Status   10/24/2023 0.09 0.00 - 0.20 10*3/mm3 Final     Immature Grans, Absolute   Date Value Ref Range Status   10/24/2023 0.06 (H) 0.00 - 0.05 10*3/mm3 Final     nRBC   Date Value Ref Range Status   10/24/2023 0.0 0.0 - 0.2 /100 WBC Final       Total Cholesterol   Date Value Ref Range Status   01/25/2024 104 0 - 200 mg/dL Final     Triglycerides   Date Value Ref Range Status   01/25/2024 79 0 - 150 mg/dL Final     HDL Cholesterol   Date Value Ref Range Status   01/25/2024 48 40 - 60 mg/dL Final     LDL Cholesterol    Date Value Ref Range Status   01/25/2024 40 0 - 100 mg/dL Final     IMAGING  CT Cervical Spine Without Contrast    Result Date: 10/24/2023   1. No acute bony abnormality.  2. Other incidental findings as above  This report was finalized on 10/24/2023 5:07 PM by Dr. Carlo Washington MD.      CT Head Without Contrast    Result Date: 10/24/2023    Unremarkable exam demonstrating no CT evidence of acute intracranial findings.  This report was finalized on 10/24/2023 5:06 PM by Dr. Carlo Washington MD.              Assessment & Plan   Diagnoses and all orders for this visit:    1. Tachycardia (Primary)  Heart rate stable and  controlled on Lopressor, will plan to continue  Recommend avoidance of caffeine/stimulants    2. Essential hypertension  Controlled, continue current therapy, continue routine monitoring  Sodium restrictions recommended    3. Hyperlipidemia, unspecified hyperlipidemia type  Discussed and reviewed recent labs, LDL is at goal of 40, will plan to continue statin, heart healthy diet    4. Multiple risk factors for coronary artery disease  Clinically asymptomatic, will continue to monitor symptoms and proceed with workup accordingly    5. Type 2 diabetes mellitus without complication, without long-term current use of insulin  Recommend tight glycemic control for overall health benefit, management by primary    6. Tobacco use  Recommend avoidance of tobacco products     7. History of syncope  Currently asymptomatic without further episodes since initiating Keppra, will continue to monitor, he is following with primary and specialty care - ENT and neurology.    Other orders  -     metoprolol tartrate (LOPRESSOR) 25 MG tablet; Take 1 tablet by mouth 2 (Two) Times a Day.  Dispense: 180 tablet; Refill: 1  -     atorvastatin (LIPITOR) 20 MG tablet; Take 1 tablet by mouth Daily.  Dispense: 90 tablet; Refill: 1    Lifestyle modifications including heart healthy diet, regular exercise, maintenance of desirable body weight and avoidance of tobacco products    Follow-up in 6 months, sooner if needed

## 2024-07-30 ENCOUNTER — OFFICE VISIT (OUTPATIENT)
Dept: CARDIOLOGY | Facility: CLINIC | Age: 54
End: 2024-07-30
Payer: COMMERCIAL

## 2024-07-30 VITALS
BODY MASS INDEX: 21.97 KG/M2 | HEART RATE: 71 BPM | OXYGEN SATURATION: 96 % | HEIGHT: 72 IN | SYSTOLIC BLOOD PRESSURE: 128 MMHG | WEIGHT: 162.2 LBS | DIASTOLIC BLOOD PRESSURE: 70 MMHG

## 2024-07-30 DIAGNOSIS — R00.0 SINUS TACHYCARDIA: Primary | ICD-10-CM

## 2024-07-30 DIAGNOSIS — E11.9 TYPE 2 DIABETES MELLITUS WITHOUT COMPLICATION, WITHOUT LONG-TERM CURRENT USE OF INSULIN: ICD-10-CM

## 2024-07-30 DIAGNOSIS — Z91.89 MULTIPLE RISK FACTORS FOR CORONARY ARTERY DISEASE: ICD-10-CM

## 2024-07-30 DIAGNOSIS — E78.5 HYPERLIPIDEMIA, UNSPECIFIED HYPERLIPIDEMIA TYPE: ICD-10-CM

## 2024-07-30 DIAGNOSIS — Z72.0 TOBACCO USE: ICD-10-CM

## 2024-07-30 DIAGNOSIS — Z87.898 HISTORY OF SYNCOPE: ICD-10-CM

## 2024-07-30 DIAGNOSIS — I10 ESSENTIAL HYPERTENSION: ICD-10-CM

## 2024-07-30 PROCEDURE — 93000 ELECTROCARDIOGRAM COMPLETE: CPT | Performed by: NURSE PRACTITIONER

## 2024-07-30 PROCEDURE — 99214 OFFICE O/P EST MOD 30 MIN: CPT | Performed by: NURSE PRACTITIONER

## 2024-07-30 NOTE — PROGRESS NOTES
Subjective     Serge Chandra is a 54 y.o. male.   Chief Complaint   Patient presents with    Rapid Heart Rate      History of Present Illness   Serge Chandra is a 54-year-old male who presents to clinic today for cardiology follow-up.    Sinus tachycardia currently on Lopressor 25 mg twice daily.  He reports heart rate well controlled on medication. He reports heart rate averages in the 80s.  He denies worsening tachycardia, bradycardia, palpitations, dizziness or syncope.  He has had intermittent syncopal episodes however denies symptoms since last visit. Had recommended he see EP for further evaluation but they canceled his appointment as he has been doing well. He reports he has seen both ENT and neurology for complete work-up of the syncopal episodes with all testing normal per patient report, medical records are unavailable for review.  Recently his wife witnessed an episode and felt it was more neurological/seizure related.  He was placed on Keppra and feel that has helped his symptoms as well.      Hypertension on Norvasc 2.5 mg daily prn, he only takes if BP is > 140. He takes lopressor twice daily. Home readings are stable, he denies hypotension.  He denies chest pain or dyspnea.  He denies any need to utilize Norvasc recently as his blood pressure has been well controlled generally around 120 over 70s to 80s.     Hyperlipidemia on Lipitor, he reports compliance with medication and denies medicine side effects.  LDL form 1/2024 at 40.  He continues to smoke.        Underlying DM currently treated by PCP on oral medications.      He reports history of anemia and is currently on iron supplements, managed by PCP.    Patient Active Problem List   Diagnosis    Essential hypertension    Hyperlipidemia    Type 2 diabetes mellitus without complication, without long-term current use of insulin    Tobacco use    Multiple risk factors for coronary artery disease    Syncope and collapse    Fracture of rib of right  side with delayed healing     Past Medical History:   Diagnosis Date    Diabetes mellitus 07/2010    GERD (gastroesophageal reflux disease)     Hyperlipidemia 10/27/2022    Hypertension 09/2021    Kidney stone      Past Surgical History:   Procedure Laterality Date    KIDNEY SURGERY       Family History   Problem Relation Age of Onset    COPD Mother     Lung cancer Mother     Arthritis Mother     Diabetes Father     Stroke Father     Parkinsonism Father     Hypertension Brother     Hypertension Brother     Heart attack Maternal Grandfather     Stroke Paternal Grandmother      Social History     Tobacco Use    Smoking status: Every Day     Current packs/day: 0.50     Average packs/day: 0.5 packs/day for 30.0 years (15.0 ttl pk-yrs)     Types: Cigarettes     Passive exposure: Past    Smokeless tobacco: Never   Vaping Use    Vaping status: Never Used   Substance Use Topics    Alcohol use: Not Currently    Drug use: Not Currently     The following portions of the patient's history were reviewed and updated as appropriate: allergies, current medications, past family history, past medical history, past social history, past surgical history and problem list.    Allergies   Allergen Reactions    Bee Venom Swelling     States yellow jackets         Current Outpatient Medications:     atorvastatin (LIPITOR) 20 MG tablet, Take 1 tablet by mouth Daily., Disp: 90 tablet, Rfl: 1    ferrous sulfate 324 (65 Fe) MG tablet delayed-release EC tablet, Take 1 tablet by mouth Daily With Breakfast., Disp: , Rfl:     gabapentin (NEURONTIN) 300 MG capsule, Take 1 capsule by mouth 2 (Two) Times a Day., Disp: , Rfl:     glyburide (DIAbeta) 5 MG tablet, Take 2 tablets by mouth 2 (Two) Times a Day., Disp: , Rfl:     levETIRAcetam (KEPPRA) 500 MG tablet, Take 1 tablet by mouth Daily., Disp: , Rfl:     metFORMIN (GLUCOPHAGE) 1000 MG tablet, Take 1 tablet by mouth 2 (Two) Times a Day., Disp: , Rfl:     metoprolol tartrate (LOPRESSOR) 25 MG tablet,  "Take 1 tablet by mouth 2 (Two) Times a Day., Disp: 180 tablet, Rfl: 1    pantoprazole (PROTONIX) 40 MG EC tablet, Take 1 tablet by mouth Daily., Disp: , Rfl:     ofloxacin (FLOXIN) 0.3 % otic solution, As Needed. (Patient not taking: Reported on 1/30/2024), Disp: , Rfl:     Review of Systems   Constitutional:  Negative for activity change, appetite change, chills, diaphoresis, fatigue and fever.   HENT:  Negative for congestion, drooling, ear discharge, ear pain, mouth sores, nosebleeds, postnasal drip, rhinorrhea, sinus pressure, sneezing and sore throat.    Eyes:  Negative for pain, discharge and visual disturbance.   Respiratory:  Negative for cough, chest tightness, shortness of breath and wheezing.    Cardiovascular:  Negative for chest pain, palpitations and leg swelling.   Gastrointestinal:  Negative for abdominal pain, constipation, diarrhea, nausea and vomiting.   Endocrine: Negative for cold intolerance, heat intolerance, polydipsia, polyphagia and polyuria.   Musculoskeletal:  Negative for arthralgias, myalgias and neck pain.   Skin:  Negative for rash and wound.   Neurological:  Negative for dizziness, syncope, speech difficulty, weakness, light-headedness and headaches.   Hematological:  Negative for adenopathy. Does not bruise/bleed easily.   Psychiatric/Behavioral:  Negative for confusion, dysphoric mood and sleep disturbance. The patient is not nervous/anxious.    All other systems reviewed and are negative.    /70 (BP Location: Left arm, Patient Position: Sitting, Cuff Size: Adult)   Pulse 71   Ht 182.9 cm (72\")   Wt 73.6 kg (162 lb 3.2 oz)   SpO2 96%   BMI 22.00 kg/m²     Objective   Allergies   Allergen Reactions    Bee Venom Swelling     States yellow jackets     Physical Exam  Vitals reviewed.   Constitutional:       Appearance: Normal appearance. He is well-developed.   HENT:      Head: Normocephalic.   Eyes:      Conjunctiva/sclera: Conjunctivae normal.   Neck:      Thyroid: No " thyromegaly.      Vascular: No carotid bruit or JVD.   Cardiovascular:      Rate and Rhythm: Normal rate and regular rhythm.   Pulmonary:      Effort: Pulmonary effort is normal.      Breath sounds: Normal breath sounds.   Musculoskeletal:      Cervical back: Neck supple.      Right lower leg: No edema.      Left lower leg: No edema.   Skin:     General: Skin is warm and dry.   Neurological:      Mental Status: He is alert and oriented to person, place, and time.   Psychiatric:         Attention and Perception: Attention normal.         Mood and Affect: Mood normal.         Speech: Speech normal.         Behavior: Behavior normal. Behavior is cooperative.         Cognition and Memory: Cognition normal.           ECG 12 Lead    Date/Time: 7/30/2024 8:57 AM  Performed by: Nayana Franklin APRN    Authorized by: Nayana Franklin APRN  Comparison: compared with previous ECG   Similar to previous ECG  Comparison to previous ECG: 10/2022  Rhythm: sinus rhythm  Rate: normal  BPM: 64    Clinical impression: normal ECG  Comments: QT/QTc - 376/385          LABS  WBC   Date Value Ref Range Status   10/24/2023 10.92 (H) 3.40 - 10.80 10*3/mm3 Final     RBC   Date Value Ref Range Status   10/24/2023 4.18 4.14 - 5.80 10*6/mm3 Final     Hemoglobin   Date Value Ref Range Status   10/24/2023 13.2 13.0 - 17.7 g/dL Final     Hematocrit   Date Value Ref Range Status   10/24/2023 39.2 37.5 - 51.0 % Final     MCV   Date Value Ref Range Status   10/24/2023 93.8 79.0 - 97.0 fL Final     MCH   Date Value Ref Range Status   10/24/2023 31.6 26.6 - 33.0 pg Final     MCHC   Date Value Ref Range Status   10/24/2023 33.7 31.5 - 35.7 g/dL Final     RDW   Date Value Ref Range Status   10/24/2023 12.0 (L) 12.3 - 15.4 % Final     RDW-SD   Date Value Ref Range Status   10/24/2023 41.8 37.0 - 54.0 fl Final     MPV   Date Value Ref Range Status   10/24/2023 11.3 6.0 - 12.0 fL Final     Platelets   Date Value Ref Range Status   10/24/2023 186 140 -  450 10*3/mm3 Final     Neutrophil %   Date Value Ref Range Status   10/24/2023 72.4 42.7 - 76.0 % Final     Lymphocyte %   Date Value Ref Range Status   10/24/2023 16.3 (L) 19.6 - 45.3 % Final     Monocyte %   Date Value Ref Range Status   10/24/2023 7.0 5.0 - 12.0 % Final     Eosinophil %   Date Value Ref Range Status   10/24/2023 3.0 0.3 - 6.2 % Final     Basophil %   Date Value Ref Range Status   10/24/2023 0.8 0.0 - 1.5 % Final     Immature Grans %   Date Value Ref Range Status   10/24/2023 0.5 0.0 - 0.5 % Final     Neutrophils, Absolute   Date Value Ref Range Status   10/24/2023 7.90 (H) 1.70 - 7.00 10*3/mm3 Final     Lymphocytes, Absolute   Date Value Ref Range Status   10/24/2023 1.78 0.70 - 3.10 10*3/mm3 Final     Monocytes, Absolute   Date Value Ref Range Status   10/24/2023 0.76 0.10 - 0.90 10*3/mm3 Final     Eosinophils, Absolute   Date Value Ref Range Status   10/24/2023 0.33 0.00 - 0.40 10*3/mm3 Final     Basophils, Absolute   Date Value Ref Range Status   10/24/2023 0.09 0.00 - 0.20 10*3/mm3 Final     Immature Grans, Absolute   Date Value Ref Range Status   10/24/2023 0.06 (H) 0.00 - 0.05 10*3/mm3 Final     nRBC   Date Value Ref Range Status   10/24/2023 0.0 0.0 - 0.2 /100 WBC Final       Total Cholesterol   Date Value Ref Range Status   01/25/2024 104 0 - 200 mg/dL Final     Triglycerides   Date Value Ref Range Status   01/25/2024 79 0 - 150 mg/dL Final     HDL Cholesterol   Date Value Ref Range Status   01/25/2024 48 40 - 60 mg/dL Final     LDL Cholesterol    Date Value Ref Range Status   01/25/2024 40 0 - 100 mg/dL Final           Assessment & Plan   Diagnoses and all orders for this visit:    1. Tachycardia (Primary)  Heart rate well-controlled, continue Lopressor  Had referred to EP for second opinion due to persistent symptoms. Symptoms have improved therefore he has elected to cancel appointment for now while monitoring.     2. Hyperlipidemia, unspecified hyperlipidemia type  -     Comprehensive  Metabolic Panel; Future  -     CK; Future  -     Lipid Panel; Future  LDL at goal, continue statin, heart healthy diet    3. Essential hypertension  -     ECG 12 Lead  Well-controlled, continue current therapy  Routine monitoring recommend  Sodium restrictions recommend    4. Multiple risk factors for coronary artery disease  Clinically asymptomatic, will continue to monitor with further workup as warranted    5. Type 2 diabetes mellitus without complication, without long-term current use of insulin  Recommend tight glycemic control for overall health benefit, management by primary    6. Tobacco use  Recommend avoidance of tobacco product    7. History of syncope  Clinically stable, he denies any further episodes.  He continues on Keppra.    Review of medical record    Lifestyle modifications including heart healthy diet, regular exercise, maintenance of desirable body weight and avoidance of tobacco products.     Follow-up in 6 months, sooner if needed

## 2024-09-24 ENCOUNTER — LAB (OUTPATIENT)
Dept: LAB | Facility: HOSPITAL | Age: 54
End: 2024-09-24
Payer: COMMERCIAL

## 2024-09-24 DIAGNOSIS — E78.5 HYPERLIPIDEMIA, UNSPECIFIED HYPERLIPIDEMIA TYPE: ICD-10-CM

## 2024-09-24 LAB
ALBUMIN SERPL-MCNC: 4.4 G/DL (ref 3.5–5.2)
ALBUMIN/GLOB SERPL: 1.9 G/DL
ALP SERPL-CCNC: 78 U/L (ref 39–117)
ALT SERPL W P-5'-P-CCNC: 21 U/L (ref 1–41)
ANION GAP SERPL CALCULATED.3IONS-SCNC: 9.9 MMOL/L (ref 5–15)
AST SERPL-CCNC: 14 U/L (ref 1–40)
BILIRUB SERPL-MCNC: 0.4 MG/DL (ref 0–1.2)
BUN SERPL-MCNC: 22 MG/DL (ref 6–20)
BUN/CREAT SERPL: 23.9 (ref 7–25)
CALCIUM SPEC-SCNC: 9.7 MG/DL (ref 8.6–10.5)
CHLORIDE SERPL-SCNC: 104 MMOL/L (ref 98–107)
CHOLEST SERPL-MCNC: 141 MG/DL (ref 0–200)
CK SERPL-CCNC: 72 U/L (ref 20–200)
CO2 SERPL-SCNC: 23.1 MMOL/L (ref 22–29)
CREAT SERPL-MCNC: 0.92 MG/DL (ref 0.76–1.27)
EGFRCR SERPLBLD CKD-EPI 2021: 98.2 ML/MIN/1.73
GLOBULIN UR ELPH-MCNC: 2.3 GM/DL
GLUCOSE SERPL-MCNC: 243 MG/DL (ref 65–99)
HDLC SERPL-MCNC: 49 MG/DL (ref 40–60)
LDLC SERPL CALC-MCNC: 71 MG/DL (ref 0–100)
LDLC/HDLC SERPL: 1.41 {RATIO}
POTASSIUM SERPL-SCNC: 4.3 MMOL/L (ref 3.5–5.2)
PROT SERPL-MCNC: 6.7 G/DL (ref 6–8.5)
SODIUM SERPL-SCNC: 137 MMOL/L (ref 136–145)
TRIGL SERPL-MCNC: 114 MG/DL (ref 0–150)
VLDLC SERPL-MCNC: 21 MG/DL (ref 5–40)

## 2024-09-24 PROCEDURE — 80061 LIPID PANEL: CPT

## 2024-09-24 PROCEDURE — 36415 COLL VENOUS BLD VENIPUNCTURE: CPT

## 2024-09-24 PROCEDURE — 80053 COMPREHEN METABOLIC PANEL: CPT

## 2024-09-24 PROCEDURE — 82550 ASSAY OF CK (CPK): CPT

## 2024-09-25 ENCOUNTER — TELEPHONE (OUTPATIENT)
Dept: CARDIOLOGY | Facility: CLINIC | Age: 54
End: 2024-09-25
Payer: COMMERCIAL

## 2024-11-06 ENCOUNTER — TRANSCRIBE ORDERS (OUTPATIENT)
Dept: ADMINISTRATIVE | Facility: HOSPITAL | Age: 54
End: 2024-11-06
Payer: COMMERCIAL

## 2024-11-06 DIAGNOSIS — Z72.0 CURRENT TOBACCO USE: Primary | ICD-10-CM

## 2025-02-03 ENCOUNTER — OFFICE VISIT (OUTPATIENT)
Dept: CARDIOLOGY | Facility: CLINIC | Age: 55
End: 2025-02-03
Payer: COMMERCIAL

## 2025-02-03 VITALS
HEART RATE: 78 BPM | WEIGHT: 160 LBS | BODY MASS INDEX: 21.67 KG/M2 | OXYGEN SATURATION: 97 % | DIASTOLIC BLOOD PRESSURE: 86 MMHG | SYSTOLIC BLOOD PRESSURE: 140 MMHG | HEIGHT: 72 IN

## 2025-02-03 DIAGNOSIS — R00.0 SINUS TACHYCARDIA: Primary | ICD-10-CM

## 2025-02-03 DIAGNOSIS — Z91.89 MULTIPLE RISK FACTORS FOR CORONARY ARTERY DISEASE: ICD-10-CM

## 2025-02-03 DIAGNOSIS — E78.5 HYPERLIPIDEMIA, UNSPECIFIED HYPERLIPIDEMIA TYPE: ICD-10-CM

## 2025-02-03 DIAGNOSIS — E11.9 TYPE 2 DIABETES MELLITUS WITHOUT COMPLICATION, WITHOUT LONG-TERM CURRENT USE OF INSULIN: ICD-10-CM

## 2025-02-03 DIAGNOSIS — Z87.898 HISTORY OF SYNCOPE: ICD-10-CM

## 2025-02-03 DIAGNOSIS — Z72.0 TOBACCO USE: ICD-10-CM

## 2025-02-03 DIAGNOSIS — I10 ESSENTIAL HYPERTENSION: ICD-10-CM

## 2025-02-03 NOTE — PROGRESS NOTES
Subjective     Serge Chandra is a 54 y.o. male.   Chief Complaint   Patient presents with    Follow-up      History of Present Illness   History of Present Illness  Serge Chandra is a 54-year-old male who presents to clinic today for cardiology follow-up. The patient presents for evaluation of blood pressure, heart rate, cholesterol.     Sinus tachycardia currently on Lopressor 25 mg twice daily. He reports no recent episodes of syncope and has opted against consulting an electrophysiologist. His heart rate remains stable, averaging around 90 beats per minute, with occasional fluctuations above or below this baseline. He continues to take Lopressor. He denies worsening palpitations, tachycardia, bradycardia or dizziness.     Hypertension on Norvasc 2.5 mg daily prn, he only takes if BP is > 140. His blood pressure readings have been inconsistent, with home measurements indicating good control, but office readings consistently elevated. He attributes this discrepancy to potential differences in measurement equipment. His blood pressure is generally less than 140/90. He continues to take metoprolol.  He denies chest pain or dyspnea.     Hyperlipidemia maintains his regimen of Lipitor for cholesterol management.     He also continues to take Keppra and has been seizure-free for over a year. He believes that his previous physician's advice to maintain an A1c level below 5 may have contributed to his syncope episodes.    He underwent routine blood work last Tuesday, which included an A1c test. His primary care physician informed him that his kidney function was within normal limits. No records available for review.       Supplemental Information  He takes iron supplements intermittently, occasionally forgetting to do so.    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     MEDICATIONS  Lopressor, metoprolol, Lipitor, iron supplements, Keppra      Patient Active Problem List    Diagnosis    Essential hypertension    Hyperlipidemia    Type 2 diabetes mellitus without complication, without long-term current use of insulin    Tobacco use    Multiple risk factors for coronary artery disease    Syncope and collapse    Fracture of rib of right side with delayed healing     Past Medical History:   Diagnosis Date    Diabetes mellitus 07/2010    GERD (gastroesophageal reflux disease)     Hyperlipidemia 10/27/2022    Hypertension 09/2021    Kidney stone      Past Surgical History:   Procedure Laterality Date    KIDNEY SURGERY         Family History   Problem Relation Age of Onset    COPD Mother     Lung cancer Mother     Arthritis Mother     Diabetes Father     Stroke Father     Parkinsonism Father     Hypertension Brother     Hypertension Brother     Heart attack Maternal Grandfather     Stroke Paternal Grandmother      Social History     Tobacco Use    Smoking status: Every Day     Current packs/day: 0.50     Average packs/day: 0.5 packs/day for 30.0 years (15.0 ttl pk-yrs)     Types: Cigarettes     Passive exposure: Past    Smokeless tobacco: Never   Vaping Use    Vaping status: Never Used   Substance Use Topics    Alcohol use: Not Currently    Drug use: Not Currently         The following portions of the patient's history were reviewed and updated as appropriate: allergies, current medications, past family history, past medical history, past social history, past surgical history and problem list.    Allergies   Allergen Reactions    Bee Venom Swelling     States yellow jackets         Current Outpatient Medications:     atorvastatin (LIPITOR) 20 MG tablet, Take 1 tablet by mouth Daily., Disp: 90 tablet, Rfl: 1    ferrous sulfate 324 (65 Fe) MG tablet delayed-release EC tablet, Take 1 tablet by mouth Daily With Breakfast., Disp: , Rfl:     gabapentin (NEURONTIN) 300 MG capsule, Take 1 capsule by mouth 2 (Two) Times a Day., Disp: , Rfl:     glyburide (DIAbeta) 5 MG tablet, Take 2 tablets by  "mouth 2 (Two) Times a Day., Disp: , Rfl:     levETIRAcetam (KEPPRA) 500 MG tablet, Take 1 tablet by mouth Daily., Disp: , Rfl:     metFORMIN (GLUCOPHAGE) 1000 MG tablet, Take 1 tablet by mouth 2 (Two) Times a Day., Disp: , Rfl:     metoprolol tartrate (LOPRESSOR) 25 MG tablet, Take 1 tablet by mouth 2 (Two) Times a Day., Disp: 180 tablet, Rfl: 1    pantoprazole (PROTONIX) 40 MG EC tablet, Take 1 tablet by mouth Daily., Disp: , Rfl:     ofloxacin (FLOXIN) 0.3 % otic solution, As Needed. (Patient not taking: Reported on 2/3/2025), Disp: , Rfl:     Review of Systems   Constitutional:  Negative for activity change, appetite change, chills, diaphoresis, fatigue and fever.   HENT:  Negative for congestion, drooling, ear discharge, ear pain, mouth sores, nosebleeds, postnasal drip, rhinorrhea, sinus pressure, sneezing and sore throat.    Eyes:  Negative for pain, discharge and visual disturbance.   Respiratory:  Negative for cough, chest tightness, shortness of breath and wheezing.    Cardiovascular:  Negative for chest pain, palpitations and leg swelling.   Gastrointestinal:  Negative for abdominal pain, constipation, diarrhea, nausea and vomiting.   Endocrine: Negative for cold intolerance, heat intolerance, polydipsia, polyphagia and polyuria.   Musculoskeletal:  Negative for arthralgias, myalgias and neck pain.   Skin:  Negative for rash and wound.   Neurological:  Negative for dizziness, syncope, speech difficulty, weakness, light-headedness and headaches.   Hematological:  Negative for adenopathy. Does not bruise/bleed easily.   Psychiatric/Behavioral:  Negative for confusion, dysphoric mood and sleep disturbance. The patient is not nervous/anxious.    All other systems reviewed and are negative.    /86 (BP Location: Left arm, Patient Position: Sitting, Cuff Size: Adult)   Pulse 78   Ht 182.9 cm (72\")   Wt 72.6 kg (160 lb)   SpO2 97%   BMI 21.70 kg/m²     Recheck 120/68    Objective   Allergies   Allergen " Reactions    Bee Venom Swelling     States yellow jackets       Physical Exam  Vitals reviewed.   Constitutional:       Appearance: Normal appearance. He is well-developed.   HENT:      Head: Normocephalic.   Eyes:      Conjunctiva/sclera: Conjunctivae normal.   Neck:      Thyroid: No thyromegaly.      Vascular: No carotid bruit or JVD.   Cardiovascular:      Rate and Rhythm: Normal rate and regular rhythm.   Pulmonary:      Effort: Pulmonary effort is normal.      Breath sounds: Normal breath sounds.   Abdominal:      General: Bowel sounds are normal.      Palpations: Abdomen is soft. There is no hepatomegaly, splenomegaly or mass.      Tenderness: There is no abdominal tenderness.   Musculoskeletal:      Cervical back: Neck supple.      Right lower leg: No edema.      Left lower leg: No edema.   Skin:     General: Skin is warm and dry.   Neurological:      Mental Status: He is alert and oriented to person, place, and time.   Psychiatric:         Attention and Perception: Attention normal.         Mood and Affect: Mood normal.         Speech: Speech normal.         Behavior: Behavior normal. Behavior is cooperative.         Cognition and Memory: Cognition normal.         LABS  WBC   Date Value Ref Range Status   10/24/2023 10.92 (H) 3.40 - 10.80 10*3/mm3 Final     RBC   Date Value Ref Range Status   10/24/2023 4.18 4.14 - 5.80 10*6/mm3 Final     Hemoglobin   Date Value Ref Range Status   10/24/2023 13.2 13.0 - 17.7 g/dL Final     Hematocrit   Date Value Ref Range Status   10/24/2023 39.2 37.5 - 51.0 % Final     MCV   Date Value Ref Range Status   10/24/2023 93.8 79.0 - 97.0 fL Final     MCH   Date Value Ref Range Status   10/24/2023 31.6 26.6 - 33.0 pg Final     MCHC   Date Value Ref Range Status   10/24/2023 33.7 31.5 - 35.7 g/dL Final     RDW   Date Value Ref Range Status   10/24/2023 12.0 (L) 12.3 - 15.4 % Final     RDW-SD   Date Value Ref Range Status   10/24/2023 41.8 37.0 - 54.0 fl Final     MPV   Date Value  Ref Range Status   10/24/2023 11.3 6.0 - 12.0 fL Final     Platelets   Date Value Ref Range Status   10/24/2023 186 140 - 450 10*3/mm3 Final     Neutrophil %   Date Value Ref Range Status   10/24/2023 72.4 42.7 - 76.0 % Final     Lymphocyte %   Date Value Ref Range Status   10/24/2023 16.3 (L) 19.6 - 45.3 % Final     Monocyte %   Date Value Ref Range Status   10/24/2023 7.0 5.0 - 12.0 % Final     Eosinophil %   Date Value Ref Range Status   10/24/2023 3.0 0.3 - 6.2 % Final     Basophil %   Date Value Ref Range Status   10/24/2023 0.8 0.0 - 1.5 % Final     Immature Grans %   Date Value Ref Range Status   10/24/2023 0.5 0.0 - 0.5 % Final     Neutrophils, Absolute   Date Value Ref Range Status   10/24/2023 7.90 (H) 1.70 - 7.00 10*3/mm3 Final     Lymphocytes, Absolute   Date Value Ref Range Status   10/24/2023 1.78 0.70 - 3.10 10*3/mm3 Final     Monocytes, Absolute   Date Value Ref Range Status   10/24/2023 0.76 0.10 - 0.90 10*3/mm3 Final     Eosinophils, Absolute   Date Value Ref Range Status   10/24/2023 0.33 0.00 - 0.40 10*3/mm3 Final     Basophils, Absolute   Date Value Ref Range Status   10/24/2023 0.09 0.00 - 0.20 10*3/mm3 Final     Immature Grans, Absolute   Date Value Ref Range Status   10/24/2023 0.06 (H) 0.00 - 0.05 10*3/mm3 Final     nRBC   Date Value Ref Range Status   10/24/2023 0.0 0.0 - 0.2 /100 WBC Final       Total Cholesterol   Date Value Ref Range Status   09/24/2024 141 0 - 200 mg/dL Final     Triglycerides   Date Value Ref Range Status   09/24/2024 114 0 - 150 mg/dL Final     HDL Cholesterol   Date Value Ref Range Status   09/24/2024 49 40 - 60 mg/dL Final     LDL Cholesterol    Date Value Ref Range Status   09/24/2024 71 0 - 100 mg/dL Final           Assessment & Plan   Diagnoses and all orders for this visit:    1. Sinus tachycardia (Primary)  His heart rate is stable, averaging around 90 bpm. He is advised to continue his current medication regimen of Lopressor 25 mg twice a day.    2. Essential  hypertension  His systolic blood pressure was slightly elevated during the initial measurement but normalized upon re-evaluation. He is advised to monitor his blood pressure daily, preferably twice a day, and report any significant elevations. If his systolic blood pressure consistently measures in the mid to high 130s or 140s, the reintroduction of low-dose Norvasc will be considered.    3. Hyperlipidemia, unspecified hyperlipidemia type  His cholesterol levels are within acceptable ranges, with an LDL of 71. He is encouraged to maintain a healthy diet and regular exercise regimen. He should continue taking Lipitor as prescribed.    4. Multiple risk factors for coronary artery disease  Aggressive risk factor modification     5. Type 2 diabetes mellitus without complication, without long-term current use of insulin  His A1c is currently at 7, which is acceptable for diabetes management. He is advised to aim for an A1c closer to 6.5 without causing hypoglycemia. He should continue his current diabetes management plan and follow up with his primary care provider for routine labs.    6. Tobacco use  Recommend avoidance of tobacco products     7. History of syncope  Clinically asymptomatic, continue to monitor         Assessment & Plan    Review of medical record    Lifestyle modifications including heart healthy diet, regular exercise, maintenance of desirable body weight and avoidance of tobacco products              Follow-up  The patient will follow up in 6 months or sooner if necessary.             Patient or patient representative verbalized consent for the use of Ambient Listening during the visit with  SHAUN Murillo for chart documentation. 2/19/2025  10:16 EST

## 2025-02-03 NOTE — Clinical Note
February 19, 2025     SHAUN Diop  [address]  [facility] [address]  [facility] [facility] [address]    Patient: Serge Chandra   YOB: 1970   Date of Visit: 2/3/2025       Dear SHAUN Diop    Serge Chandra was in my office today. Below is a copy of my note.    If you have questions, please do not hesitate to call me. I look forward to following Serge along with you.         Sincerely,        SHAUN Murillo        CC: No Recipients    Subjective    Serge Chandra is a 54 y.o. male.   Chief Complaint   Patient presents with   • Follow-up      History of Present Illness   History of Present Illness  The patient presents for evaluation of blood pressure, heart rate, cholesterol, diabetes, and seizure disorder.    He reports no recent episodes of syncope and has opted against consulting an electrophysiologist. His heart rate remains stable, averaging around 90 beats per minute, with occasional fluctuations above or below this baseline. He continues to take Lopressor 25 mg twice daily.    His blood pressure readings have been inconsistent, with home measurements indicating good control, but office readings consistently elevated. He attributes this discrepancy to potential differences in measurement equipment. His blood pressure is generally less than 140/90. He continues to take metoprolol.    He maintains his regimen of Lipitor for cholesterol management. He does not experience any chest discomfort, respiratory distress, irregular heart rhythms, or peripheral edema.    He also continues to take Keppra and has been seizure-free for over a year. He believes that his previous physician's advice to maintain an A1c level below 5 may have contributed to his syncope episodes.    He underwent routine blood work last Tuesday, which included an A1c test. His primary care physician informed him that his kidney function was within normal limits during the last evaluation. He has a colonoscopy scheduled for next  week.    Supplemental Information  He takes iron supplements intermittently, occasionally forgetting to do so.    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 He has been offered a job at [employer].    MEDICATIONS  Lopressor, metoprolol, Lipitor, iron supplements, Keppra      Patient Active Problem List   Diagnosis   • Essential hypertension   • Hyperlipidemia   • Type 2 diabetes mellitus without complication, without long-term current use of insulin   • Tobacco use   • Multiple risk factors for coronary artery disease   • Syncope and collapse   • Fracture of rib of right side with delayed healing     Past Medical History:   Diagnosis Date   • Diabetes mellitus 07/2010   • GERD (gastroesophageal reflux disease)    • Hyperlipidemia 10/27/2022   • Hypertension 09/2021   • Kidney stone      Past Surgical History:   Procedure Laterality Date   • KIDNEY SURGERY         Family History   Problem Relation Age of Onset   • COPD Mother    • Lung cancer Mother    • Arthritis Mother    • Diabetes Father    • Stroke Father    • Parkinsonism Father    • Hypertension Brother    • Hypertension Brother    • Heart attack Maternal Grandfather    • Stroke Paternal Grandmother      Social History     Tobacco Use   • Smoking status: Every Day     Current packs/day: 0.50     Average packs/day: 0.5 packs/day for 30.0 years (15.0 ttl pk-yrs)     Types: Cigarettes     Passive exposure: Past   • Smokeless tobacco: Never   Vaping Use   • Vaping status: Never Used   Substance Use Topics   • Alcohol use: Not Currently   • Drug use: Not Currently         The following portions of the patient's history were reviewed and updated as appropriate: allergies, current medications, past family history, past medical history, past social history, past surgical history and problem list.    Allergies   Allergen Reactions   • Bee Venom Swelling     States yellow jackets         Current Outpatient Medications:   •   "atorvastatin (LIPITOR) 20 MG tablet, Take 1 tablet by mouth Daily., Disp: 90 tablet, Rfl: 1  •  ferrous sulfate 324 (65 Fe) MG tablet delayed-release EC tablet, Take 1 tablet by mouth Daily With Breakfast., Disp: , Rfl:   •  gabapentin (NEURONTIN) 300 MG capsule, Take 1 capsule by mouth 2 (Two) Times a Day., Disp: , Rfl:   •  glyburide (DIAbeta) 5 MG tablet, Take 2 tablets by mouth 2 (Two) Times a Day., Disp: , Rfl:   •  levETIRAcetam (KEPPRA) 500 MG tablet, Take 1 tablet by mouth Daily., Disp: , Rfl:   •  metFORMIN (GLUCOPHAGE) 1000 MG tablet, Take 1 tablet by mouth 2 (Two) Times a Day., Disp: , Rfl:   •  metoprolol tartrate (LOPRESSOR) 25 MG tablet, Take 1 tablet by mouth 2 (Two) Times a Day., Disp: 180 tablet, Rfl: 1  •  pantoprazole (PROTONIX) 40 MG EC tablet, Take 1 tablet by mouth Daily., Disp: , Rfl:   •  ofloxacin (FLOXIN) 0.3 % otic solution, As Needed. (Patient not taking: Reported on 2/3/2025), Disp: , Rfl:     Review of Systems    /86 (BP Location: Left arm, Patient Position: Sitting, Cuff Size: Adult)   Pulse 78   Ht 182.9 cm (72\")   Wt 72.6 kg (160 lb)   SpO2 97%   BMI 21.70 kg/m²     Objective  Allergies   Allergen Reactions   • Bee Venom Swelling     States yellow jackets       Physical Exam    Procedures    [unfilled]  WBC   Date Value Ref Range Status   10/24/2023 10.92 (H) 3.40 - 10.80 10*3/mm3 Final     RBC   Date Value Ref Range Status   10/24/2023 4.18 4.14 - 5.80 10*6/mm3 Final     Hemoglobin   Date Value Ref Range Status   10/24/2023 13.2 13.0 - 17.7 g/dL Final     Hematocrit   Date Value Ref Range Status   10/24/2023 39.2 37.5 - 51.0 % Final     MCV   Date Value Ref Range Status   10/24/2023 93.8 79.0 - 97.0 fL Final     MCH   Date Value Ref Range Status   10/24/2023 31.6 26.6 - 33.0 pg Final     MCHC   Date Value Ref Range Status   10/24/2023 33.7 31.5 - 35.7 g/dL Final     RDW   Date Value Ref Range Status   10/24/2023 12.0 (L) 12.3 - 15.4 % Final     RDW-SD   Date Value Ref " Range Status   10/24/2023 41.8 37.0 - 54.0 fl Final     MPV   Date Value Ref Range Status   10/24/2023 11.3 6.0 - 12.0 fL Final     Platelets   Date Value Ref Range Status   10/24/2023 186 140 - 450 10*3/mm3 Final     Neutrophil %   Date Value Ref Range Status   10/24/2023 72.4 42.7 - 76.0 % Final     Lymphocyte %   Date Value Ref Range Status   10/24/2023 16.3 (L) 19.6 - 45.3 % Final     Monocyte %   Date Value Ref Range Status   10/24/2023 7.0 5.0 - 12.0 % Final     Eosinophil %   Date Value Ref Range Status   10/24/2023 3.0 0.3 - 6.2 % Final     Basophil %   Date Value Ref Range Status   10/24/2023 0.8 0.0 - 1.5 % Final     Immature Grans %   Date Value Ref Range Status   10/24/2023 0.5 0.0 - 0.5 % Final     Neutrophils, Absolute   Date Value Ref Range Status   10/24/2023 7.90 (H) 1.70 - 7.00 10*3/mm3 Final     Lymphocytes, Absolute   Date Value Ref Range Status   10/24/2023 1.78 0.70 - 3.10 10*3/mm3 Final     Monocytes, Absolute   Date Value Ref Range Status   10/24/2023 0.76 0.10 - 0.90 10*3/mm3 Final     Eosinophils, Absolute   Date Value Ref Range Status   10/24/2023 0.33 0.00 - 0.40 10*3/mm3 Final     Basophils, Absolute   Date Value Ref Range Status   10/24/2023 0.09 0.00 - 0.20 10*3/mm3 Final     Immature Grans, Absolute   Date Value Ref Range Status   10/24/2023 0.06 (H) 0.00 - 0.05 10*3/mm3 Final     nRBC   Date Value Ref Range Status   10/24/2023 0.0 0.0 - 0.2 /100 WBC Final   LASTLAB(GLUCOSE,NA,K,CO2,CL,ANIONGAP,CREATININE,BUN,BCR,CALCIUM,EGFRIFNONA,ALKPHOS,PROTEINTOT,ALT,AST,BILITOT,ALBUMIN,GLOB,LABIL2)@  Total Cholesterol   Date Value Ref Range Status   09/24/2024 141 0 - 200 mg/dL Final     Triglycerides   Date Value Ref Range Status   09/24/2024 114 0 - 150 mg/dL Final     HDL Cholesterol   Date Value Ref Range Status   09/24/2024 49 40 - 60 mg/dL Final     LDL Cholesterol    Date Value Ref Range Status   09/24/2024 71 0 - 100 mg/dL Final       No Images in the past 120 days  found..          Assessment & Plan  There are no diagnoses linked to this encounter.  No diagnosis found.  Assessment & Plan  1. Blood pressure management.  His systolic blood pressure was slightly elevated during the initial measurement but normalized upon re-evaluation. He is advised to monitor his blood pressure daily, preferably twice a day, and report any significant elevations. If his systolic blood pressure consistently measures in the mid to high 130s or 140s, the reintroduction of low-dose Norvasc will be considered.    2. Heart rate management.  His heart rate is stable, averaging around 90 bpm. He is advised to continue his current medication regimen of Lopressor 25 mg twice a day.    3. Cholesterol management.  His cholesterol levels are within acceptable ranges, with an LDL of 71. He is encouraged to maintain a healthy diet and regular exercise regimen. He should continue taking Lipitor as prescribed.    4. Diabetes mellitus.  His A1c is currently at 7, which is acceptable for diabetes management. He is advised to aim for an A1c closer to 6.5 without causing hypoglycemia. He should continue his current diabetes management plan and follow up with his primary care provider for routine labs.    5. Seizure disorder.  He has not had a seizure episode for over a year. He is advised to continue taking Keppra as prescribed.    6. Health maintenance.  He has a colonoscopy scheduled for next week.    Follow-up  The patient will follow up in 6 months or sooner if necessary.             {KEVIN [employer] Provider Statement:[id number]}

## 2025-02-19 ENCOUNTER — TELEPHONE (OUTPATIENT)
Dept: CARDIOLOGY | Facility: CLINIC | Age: 55
End: 2025-02-19
Payer: COMMERCIAL

## 2025-02-19 RX ORDER — METOPROLOL TARTRATE 25 MG/1
25 TABLET, FILM COATED ORAL 2 TIMES DAILY
Qty: 180 TABLET | Refills: 1 | Status: SHIPPED | OUTPATIENT
Start: 2025-02-19

## 2025-02-19 NOTE — TELEPHONE ENCOUNTER
----- Message from Nayana Franklin sent at 2/19/2025 12:11 PM EST -----  Can we get labs from PCP?  Thanks,  Nayana

## 2025-08-25 RX ORDER — METOPROLOL TARTRATE 25 MG/1
25 TABLET, FILM COATED ORAL 2 TIMES DAILY
Qty: 60 TABLET | Refills: 0 | Status: SHIPPED | OUTPATIENT
Start: 2025-08-25